# Patient Record
Sex: FEMALE | Race: BLACK OR AFRICAN AMERICAN | NOT HISPANIC OR LATINO
[De-identification: names, ages, dates, MRNs, and addresses within clinical notes are randomized per-mention and may not be internally consistent; named-entity substitution may affect disease eponyms.]

---

## 2018-07-18 PROBLEM — Z00.00 ENCOUNTER FOR PREVENTIVE HEALTH EXAMINATION: Status: ACTIVE | Noted: 2018-07-18

## 2018-07-31 ENCOUNTER — ASOB RESULT (OUTPATIENT)
Age: 23
End: 2018-07-31

## 2018-07-31 ENCOUNTER — EMERGENCY (EMERGENCY)
Facility: HOSPITAL | Age: 23
LOS: 1 days | Discharge: HOME | End: 2018-07-31
Attending: EMERGENCY MEDICINE | Admitting: EMERGENCY MEDICINE
Payer: COMMERCIAL

## 2018-07-31 ENCOUNTER — APPOINTMENT (OUTPATIENT)
Dept: ANTEPARTUM | Facility: CLINIC | Age: 23
End: 2018-07-31
Payer: COMMERCIAL

## 2018-07-31 VITALS
HEART RATE: 83 BPM | SYSTOLIC BLOOD PRESSURE: 111 MMHG | TEMPERATURE: 98 F | OXYGEN SATURATION: 100 % | RESPIRATION RATE: 16 BRPM | DIASTOLIC BLOOD PRESSURE: 64 MMHG

## 2018-07-31 LAB
ALBUMIN SERPL ELPH-MCNC: 4.4 G/DL — SIGNIFICANT CHANGE UP (ref 3.3–5)
ALP SERPL-CCNC: 47 U/L — SIGNIFICANT CHANGE UP (ref 40–120)
ALT FLD-CCNC: 9 U/L — SIGNIFICANT CHANGE UP (ref 4–33)
AST SERPL-CCNC: 18 U/L — SIGNIFICANT CHANGE UP (ref 4–32)
BASOPHILS # BLD AUTO: 0.04 K/UL — SIGNIFICANT CHANGE UP (ref 0–0.2)
BASOPHILS NFR BLD AUTO: 0.4 % — SIGNIFICANT CHANGE UP (ref 0–2)
BILIRUB SERPL-MCNC: 0.3 MG/DL — SIGNIFICANT CHANGE UP (ref 0.2–1.2)
BUN SERPL-MCNC: 5 MG/DL — LOW (ref 7–23)
CALCIUM SERPL-MCNC: 9.7 MG/DL — SIGNIFICANT CHANGE UP (ref 8.4–10.5)
CHLORIDE SERPL-SCNC: 101 MMOL/L — SIGNIFICANT CHANGE UP (ref 98–107)
CO2 SERPL-SCNC: 22 MMOL/L — SIGNIFICANT CHANGE UP (ref 22–31)
CREAT SERPL-MCNC: 0.64 MG/DL — SIGNIFICANT CHANGE UP (ref 0.5–1.3)
EOSINOPHIL # BLD AUTO: 0.18 K/UL — SIGNIFICANT CHANGE UP (ref 0–0.5)
EOSINOPHIL NFR BLD AUTO: 1.6 % — SIGNIFICANT CHANGE UP (ref 0–6)
GLUCOSE SERPL-MCNC: 107 MG/DL — HIGH (ref 70–99)
HCG SERPL-ACNC: SIGNIFICANT CHANGE UP MIU/ML
HCT VFR BLD CALC: 34.8 % — SIGNIFICANT CHANGE UP (ref 34.5–45)
HGB BLD-MCNC: 11.2 G/DL — LOW (ref 11.5–15.5)
IMM GRANULOCYTES # BLD AUTO: 0.05 # — SIGNIFICANT CHANGE UP
IMM GRANULOCYTES NFR BLD AUTO: 0.5 % — SIGNIFICANT CHANGE UP (ref 0–1.5)
LYMPHOCYTES # BLD AUTO: 1.55 K/UL — SIGNIFICANT CHANGE UP (ref 1–3.3)
LYMPHOCYTES # BLD AUTO: 14 % — SIGNIFICANT CHANGE UP (ref 13–44)
MCHC RBC-ENTMCNC: 25.2 PG — LOW (ref 27–34)
MCHC RBC-ENTMCNC: 32.2 % — SIGNIFICANT CHANGE UP (ref 32–36)
MCV RBC AUTO: 78.2 FL — LOW (ref 80–100)
MONOCYTES # BLD AUTO: 0.79 K/UL — SIGNIFICANT CHANGE UP (ref 0–0.9)
MONOCYTES NFR BLD AUTO: 7.1 % — SIGNIFICANT CHANGE UP (ref 2–14)
NEUTROPHILS # BLD AUTO: 8.48 K/UL — HIGH (ref 1.8–7.4)
NEUTROPHILS NFR BLD AUTO: 76.4 % — SIGNIFICANT CHANGE UP (ref 43–77)
NRBC # FLD: 0 — SIGNIFICANT CHANGE UP
PLATELET # BLD AUTO: 391 K/UL — SIGNIFICANT CHANGE UP (ref 150–400)
PMV BLD: 9.5 FL — SIGNIFICANT CHANGE UP (ref 7–13)
POTASSIUM SERPL-MCNC: 4 MMOL/L — SIGNIFICANT CHANGE UP (ref 3.5–5.3)
POTASSIUM SERPL-SCNC: 4 MMOL/L — SIGNIFICANT CHANGE UP (ref 3.5–5.3)
PROT SERPL-MCNC: 7.8 G/DL — SIGNIFICANT CHANGE UP (ref 6–8.3)
RBC # BLD: 4.45 M/UL — SIGNIFICANT CHANGE UP (ref 3.8–5.2)
RBC # FLD: 16.2 % — HIGH (ref 10.3–14.5)
SODIUM SERPL-SCNC: 138 MMOL/L — SIGNIFICANT CHANGE UP (ref 135–145)
WBC # BLD: 11.09 K/UL — HIGH (ref 3.8–10.5)
WBC # FLD AUTO: 11.09 K/UL — HIGH (ref 3.8–10.5)

## 2018-07-31 PROCEDURE — 76801 OB US < 14 WKS SINGLE FETUS: CPT

## 2018-07-31 PROCEDURE — 99285 EMERGENCY DEPT VISIT HI MDM: CPT

## 2018-07-31 NOTE — ED PROVIDER NOTE - ATTENDING CONTRIBUTION TO CARE
I, Joe Barragan MD, personally saw the patient with the resident, and completed the key components of the history and physical exam. I then discussed the management plan with the resident.    22F 6 week pregnant () with no other PMH presents to the ED after ultrasound at her Gyn showed no fetal cardiac activity.  Dates by LMP approx 12 wks, US showing 6wk fetus w/o cardiac activity on routine first US.  Pt was unsure of what was supposed to do so came to ED.  Denies AP, vaginal pain/bleeding, fever, CP, SOB.    ***GEN - NAD; well appearing; A+O x3 ***HEAD - NC/AT   ***PULMONARY - CTA b/l, symmetric breath sounds. ***CARDIAC -s1s2, RRR, no M,G,R  ***ABDOMEN - ND, NT, soft, no guarding, no rebound, no masses    ***SKIN - no rash or bruising   ***NEUROLOGIC - alert and oriented, follows commands, sensation nl, motor nl, gait nl, ***PSYCH - insight and judgment nl, memory nl, affect nl, thought nl    See MDM

## 2018-07-31 NOTE — ED PROVIDER NOTE - OBJECTIVE STATEMENT
OB/GYN, Vikas 22F 6 week pregnant with no other PMH presents to the ED after ultrasound at her Gyn showed no fetal cardiac activity. She came to the Ed because she was worried if there was anything else she needs to do. Pt denies abdominal pain, vaginal bleeding, unusual discharge, dysuria, or other urinary complaints. Pt's ultrasound also showed a [id number] R ovarian cyst. No fever, chills, n/v.

## 2018-07-31 NOTE — ED ADULT NURSE NOTE - CHIEF COMPLAINT
(coverage RN [phone]) The patient is a 22y Female sent from OB.  Pt reports 12 weeks of pregnancy, did ultrasound with no fetal heartbeat today.  .  last OB check was 1 month ago without sonogram.  Denies vaginal bleeding, admits backpain.  Pmh of L ovarian cyst.  IV accessed.  labs sent.  Waiting to be seen.

## 2018-07-31 NOTE — ED PROVIDER NOTE - NS ED ROS FT
CONSTITUTIONAL: No fever, chills, or malaise  NEUROLOGICAL: No numbness or weakness   SKIN: No rash or pruritus  EYES: No blurred vision or diplopia  ENT: No nasal congestion, rhinorrhea, sore throat, or dysphagoa   NECK: No neck pain or stiffness  RESPIRATORY: No cough or shortness of breath  CARDIOVASCULAR: No chest pain or palpitations  GASTROINTESTINAL: No abdominal pain, nausea, vomiting, diarrhea, melena, or hematochezia   GENITOURINARY: see HPI  MUSCULOSKELATAL: No joint or back pain   HEMATOLOGIC: No easy bleeding or bruising   All other review of systems is negative unless indicated above

## 2018-07-31 NOTE — ED ADULT TRIAGE NOTE - CHIEF COMPLAINT QUOTE
pt 12 weeks pregnant (JHOANA 2/11/19). had US today and was told there was no fetal heartbeat. denies vaginal bleeding. c.o back pain. denies pmh.

## 2018-07-31 NOTE — ED PROVIDER NOTE - MEDICAL DECISION MAKING DETAILS
pt w/ threatened AB by US performed (access via outpatient EMR), will get OB consult Pt with threatened , no vaginal bleeding. Gyn consulted, will follow up with outpatient Gyn tomorrow for repeat HCG.

## 2018-07-31 NOTE — ED PROVIDER NOTE - PHYSICAL EXAMINATION
General: NAD, appears comfortable  Skin: Warm and dry  Neuro: AAOx3, nonfocal  HEENT: PERRL, EOMI, no oral lesions  Neck: Full range of motion, no JVD  Lungs: Clear to ascultation bilatereally, no wheezes, rales, or rhonchi   Heart: Regular rate and rhythm, no murmurs  Abdomen: Normoactive bowl sounds, soft, nontender, nondistended  Extremities: No lower extremity tenderness, erythema, or edema   Vascular: 2+ radial and pedal pulses  Lymph: no cervical lymphadenopathy  Psych: normal mood and affect

## 2018-07-31 NOTE — ED PROVIDER NOTE - PROGRESS NOTE DETAILS
[place]: Evaluated by OBGYN, likely fetal demise (given hcg) vs. early pregnancy. Recommending outpt f/u, given copy of results, comfortable for dc.

## 2018-08-01 DIAGNOSIS — O02.1 MISSED ABORTION: ICD-10-CM

## 2018-08-01 LAB
BLD GP AB SCN SERPL QL: NEGATIVE — SIGNIFICANT CHANGE UP
RH IG SCN BLD-IMP: POSITIVE — SIGNIFICANT CHANGE UP

## 2018-08-01 NOTE — CONSULT NOTE ADULT - PROBLEM SELECTOR RECOMMENDATION 9
- TVUS obtained 7/31, as above. Patient with no pain or bleeding. Presented due to concerns of miscarriage and what to do. Patient counseled on need to repeat ultrasound and bHCG to follow. Patient to get repeat bHCG and have appointment with Dr. Bean on Thursday in the office. Aware that if there is any bleeding, pain, discomfort, she should call or come to the emergency room. All questions answered and addressed, patient understands.     D/w Dr. Bhavana Edmondson, PGY-2

## 2018-08-01 NOTE — CONSULT NOTE ADULT - SUBJECTIVE AND OBJECTIVE BOX
GYN Consult Note     22y  w/ LMP 18 presents from home after having ultrasound performed today with fetal pole with no cardiac activity, measuring 6w1d. Patient comes in tonight for concerns of miscarriage and wondering if there is anything else to be done. Patient reports intermittent nausea with two episodes of vomiting today. Reports + home pregnancy test at end of May and was seen in the office at the end of  by Dr. Bean but no ultrasound was obtained. This is a desired pregnancy. Denies any vaginal bleeding, abdominal pelvic, pelvic pain, fevers, chills, CP/SOB, dysuria, diarrhea, constipation.     OB/GYN HISTORY: . Dx with R ovarian cyst today, denies any previous knowledge of cyst. Hx of abnormal pap smear w/ follow up scheduled. Denies any uterine fibroids.       Last Menstrual Period: 18     Name of GYN Physician: Dr. Bean   History of Abnormal Pap: yes        PAST MEDICAL & SURGICAL HISTORY:  No pertinent past medical history  No significant past surgical history      REVIEW OF SYSTEMS  Denies any vaginal bleeding, abdominal pelvic, pelvic pain, fevers, chills, CP/SOB, dysuria, diarrhea, constipation.     MEDICATIONS  (STANDING):    MEDICATIONS  (PRN):      Allergies    No Known Allergies    Intolerances        SOCIAL HISTORY: works as ; denies any alcohol, drug or tobacco use     FAMILY HISTORY:  No pertinent family history in first degree relatives      Vital Signs Last 24 Hrs  T(C): 36.6 (2018 21:29), Max: 36.6 (2018 21:29)  T(F): 97.9 (2018 21:29), Max: 97.9 (2018 21:29)  HR: 83 (2018 21:29) (83 - 83)  BP: 111/64 (2018 21:29) (111/64 - 111/64)  BP(mean): --  RR: 16 (2018 21:29) (16 - 16)  SpO2: 100% (2018 21:29) (100% - 100%)    PHYSICAL EXAM:      Constitutional: alert and oriented x 3    Breasts: no tenderness, no nodules    Respiratory: clear    Cardiovascular: regular rate and rhythm    Gastrointestinal: soft, non tender, no rebound or guarding, + bowel sounds. No hepatosplenomegaly, no palpable masses    Genitourinary: NEFG  Cervix: closed/ long, no CMT  Uterus: normal size, non tender  Adnexa: non tender, fullness palpated on right         LABS:                        11.2   11.09 )-----------( 391      ( 2018 22:33 )             34.8     -    138  |  101  |  5<L>  ----------------------------<  107<H>  4.0   |  22  |  0.64    Ca    9.7      2018 22:33    TPro  7.8  /  Alb  4.4  /  TBili  0.3  /  DBili  x   /  AST  18  /  ALT  9   /  AlkPhos  47            RADIOLOGY & ADDITIONAL STUDIES:    Performed today at [facility]     Cervix WNL  Left ovary 3.2 x 1.8 WNL  Right ovary 11 x 7.4 x 11.12 w/ unilocular cyst 6.0 x 9.9 x 10cm   Adnexa WNL     Fetal pole measures 6w1d - no fetal cardiac activity. Likely missed  GYN Consult Note     22y  w/ LMP 18 presents from home after having ultrasound performed today with fetal pole with no cardiac activity, measuring 6w1d. Patient comes in tonight for concerns of miscarriage and wondering if there is anything else to be done. Patient reports intermittent nausea with two episodes of vomiting today. Reports + home pregnancy test at end of May and was seen in the office at the end of  by Dr. Bean but no ultrasound was obtained. This is a desired pregnancy. Denies any vaginal bleeding, abdominal pelvic, pelvic pain, fevers, chills, CP/SOB, dysuria, diarrhea, constipation.     OB/GYN HISTORY: . Dx with R ovarian cyst today, denies any previous knowledge of cyst. Hx of abnormal pap smear w/ follow up scheduled. Denies any uterine fibroids.       Last Menstrual Period: 18     Name of GYN Physician: Dr. Bean   History of Abnormal Pap: yes        PAST MEDICAL & SURGICAL HISTORY:  No pertinent past medical history  No significant past surgical history      REVIEW OF SYSTEMS  Denies any vaginal bleeding, abdominal pelvic, pelvic pain, fevers, chills, CP/SOB, dysuria, diarrhea, constipation.     MEDICATIONS  (STANDING):    MEDICATIONS  (PRN):      Allergies    No Known Allergies    Intolerances        SOCIAL HISTORY: works as ; denies any alcohol, drug or tobacco use     FAMILY HISTORY:  No pertinent family history in first degree relatives      Vital Signs Last 24 Hrs  T(C): 36.6 (2018 21:29), Max: 36.6 (2018 21:29)  T(F): 97.9 (2018 21:29), Max: 97.9 (2018 21:29)  HR: 83 (2018 21:29) (83 - 83)  BP: 111/64 (2018 21:29) (111/64 - 111/64)  BP(mean): --  RR: 16 (2018 21:29) (16 - 16)  SpO2: 100% (2018 21:29) (100% - 100%)    PHYSICAL EXAM:      Constitutional: alert and oriented x 3    Breasts: no tenderness, no nodules    Respiratory: clear    Cardiovascular: regular rate and rhythm    Gastrointestinal: soft, non tender, no rebound or guarding, + bowel sounds. No hepatosplenomegaly, no palpable masses    Genitourinary: NEFG  Cervix: closed/ long, no CMT  Uterus: normal size, non tender  Adnexa: non tender, fullness palpated on right         LABS:                        11.2   11.09 )-----------( 391      ( 2018 22:33 )             34.8         138  |  101  |  5<L>  ----------------------------<  107<H>  4.0   |  22  |  0.64    Ca    9.7      2018 22:33    TPro  7.8  /  Alb  4.4  /  TBili  0.3  /  DBili  x   /  AST  18  /  ALT  9   /  AlkPhos  47        [facility] 17,083     RADIOLOGY & ADDITIONAL STUDIES:    Performed today at [facility]     Cervix WNL  Left ovary 3.2 x 1.8 WNL  Right ovary 11 x 7.4 x 11.12 w/ unilocular cyst 6.0 x 9.9 x 10cm   Adnexa WNL     Fetal pole measures 6w1d - no fetal cardiac activity. Likely missed

## 2018-10-10 ENCOUNTER — APPOINTMENT (OUTPATIENT)
Dept: ULTRASOUND IMAGING | Facility: IMAGING CENTER | Age: 23
End: 2018-10-10

## 2018-10-10 ENCOUNTER — OUTPATIENT (OUTPATIENT)
Dept: OUTPATIENT SERVICES | Facility: HOSPITAL | Age: 23
LOS: 1 days | End: 2018-10-10
Payer: COMMERCIAL

## 2018-10-10 DIAGNOSIS — Z00.8 ENCOUNTER FOR OTHER GENERAL EXAMINATION: ICD-10-CM

## 2018-10-10 PROCEDURE — 76830 TRANSVAGINAL US NON-OB: CPT

## 2018-10-10 PROCEDURE — 76830 TRANSVAGINAL US NON-OB: CPT | Mod: 26

## 2018-11-26 ENCOUNTER — APPOINTMENT (OUTPATIENT)
Dept: OBGYN | Facility: CLINIC | Age: 23
End: 2018-11-26
Payer: COMMERCIAL

## 2018-11-26 PROCEDURE — 99243 OFF/OP CNSLTJ NEW/EST LOW 30: CPT

## 2019-01-24 ENCOUNTER — APPOINTMENT (OUTPATIENT)
Dept: OBGYN | Facility: HOSPITAL | Age: 24
End: 2019-01-24

## 2019-02-05 ENCOUNTER — APPOINTMENT (OUTPATIENT)
Dept: OBGYN | Facility: CLINIC | Age: 24
End: 2019-02-05

## 2020-01-27 ENCOUNTER — ASOB RESULT (OUTPATIENT)
Age: 25
End: 2020-01-27

## 2020-01-27 ENCOUNTER — APPOINTMENT (OUTPATIENT)
Dept: ANTEPARTUM | Facility: CLINIC | Age: 25
End: 2020-01-27
Payer: SELF-PAY

## 2020-01-27 PROCEDURE — 76811 OB US DETAILED SNGL FETUS: CPT

## 2020-01-27 PROCEDURE — 76819 FETAL BIOPHYS PROFIL W/O NST: CPT

## 2020-01-27 PROCEDURE — 99241 OFFICE CONSULTATION NEW/ESTAB PATIENT 15 MIN: CPT | Mod: 25

## 2020-02-20 ENCOUNTER — OUTPATIENT (OUTPATIENT)
Dept: INPATIENT UNIT | Facility: HOSPITAL | Age: 25
LOS: 1 days | Discharge: HOME | End: 2020-02-20
Payer: COMMERCIAL

## 2020-02-20 VITALS — HEART RATE: 112 BPM | SYSTOLIC BLOOD PRESSURE: 104 MMHG | DIASTOLIC BLOOD PRESSURE: 54 MMHG

## 2020-02-20 VITALS — TEMPERATURE: 98 F

## 2020-02-20 DIAGNOSIS — Z98.890 OTHER SPECIFIED POSTPROCEDURAL STATES: Chronic | ICD-10-CM

## 2020-02-20 DIAGNOSIS — O26.899 OTHER SPECIFIED PREGNANCY RELATED CONDITIONS, UNSPECIFIED TRIMESTER: ICD-10-CM

## 2020-02-20 DIAGNOSIS — Z3A.00 WEEKS OF GESTATION OF PREGNANCY NOT SPECIFIED: ICD-10-CM

## 2020-02-20 LAB
ALBUMIN SERPL ELPH-MCNC: 3.5 G/DL — SIGNIFICANT CHANGE UP (ref 3.3–5)
ALP SERPL-CCNC: 154 U/L — HIGH (ref 40–120)
ALT FLD-CCNC: 8 U/L — SIGNIFICANT CHANGE UP (ref 4–33)
ANION GAP SERPL CALC-SCNC: 13 MMO/L — SIGNIFICANT CHANGE UP (ref 7–14)
APPEARANCE UR: CLEAR — SIGNIFICANT CHANGE UP
APTT BLD: 28.3 SEC — SIGNIFICANT CHANGE UP (ref 27.5–36.3)
AST SERPL-CCNC: 21 U/L — SIGNIFICANT CHANGE UP (ref 4–32)
BASOPHILS # BLD AUTO: 0.03 K/UL — SIGNIFICANT CHANGE UP (ref 0–0.2)
BASOPHILS NFR BLD AUTO: 0.3 % — SIGNIFICANT CHANGE UP (ref 0–2)
BILIRUB SERPL-MCNC: 0.4 MG/DL — SIGNIFICANT CHANGE UP (ref 0.2–1.2)
BILIRUB UR-MCNC: NEGATIVE — SIGNIFICANT CHANGE UP
BLD GP AB SCN SERPL QL: NEGATIVE — SIGNIFICANT CHANGE UP
BLOOD UR QL VISUAL: NEGATIVE — SIGNIFICANT CHANGE UP
BUN SERPL-MCNC: 2 MG/DL — LOW (ref 7–23)
CALCIUM SERPL-MCNC: 9.6 MG/DL — SIGNIFICANT CHANGE UP (ref 8.4–10.5)
CHLORIDE SERPL-SCNC: 104 MMOL/L — SIGNIFICANT CHANGE UP (ref 98–107)
CO2 SERPL-SCNC: 20 MMOL/L — LOW (ref 22–31)
COLOR SPEC: SIGNIFICANT CHANGE UP
CREAT ?TM UR-MCNC: 52.7 MG/DL — SIGNIFICANT CHANGE UP
CREAT SERPL-MCNC: 0.57 MG/DL — SIGNIFICANT CHANGE UP (ref 0.5–1.3)
EOSINOPHIL # BLD AUTO: 0.13 K/UL — SIGNIFICANT CHANGE UP (ref 0–0.5)
EOSINOPHIL NFR BLD AUTO: 1.5 % — SIGNIFICANT CHANGE UP (ref 0–6)
FIBRINOGEN PPP-MCNC: 575 MG/DL — HIGH (ref 350–510)
GLUCOSE SERPL-MCNC: 107 MG/DL — HIGH (ref 70–99)
GLUCOSE UR-MCNC: NEGATIVE — SIGNIFICANT CHANGE UP
HCT VFR BLD CALC: 33.9 % — LOW (ref 34.5–45)
HGB BLD-MCNC: 10.5 G/DL — LOW (ref 11.5–15.5)
IMM GRANULOCYTES NFR BLD AUTO: 1.5 % — SIGNIFICANT CHANGE UP (ref 0–1.5)
INR BLD: 1.05 — SIGNIFICANT CHANGE UP (ref 0.88–1.17)
KETONES UR-MCNC: NEGATIVE — SIGNIFICANT CHANGE UP
LDH SERPL L TO P-CCNC: 176 U/L — SIGNIFICANT CHANGE UP (ref 135–225)
LEUKOCYTE ESTERASE UR-ACNC: NEGATIVE — SIGNIFICANT CHANGE UP
LYMPHOCYTES # BLD AUTO: 2 K/UL — SIGNIFICANT CHANGE UP (ref 1–3.3)
LYMPHOCYTES # BLD AUTO: 22.7 % — SIGNIFICANT CHANGE UP (ref 13–44)
MCHC RBC-ENTMCNC: 24.8 PG — LOW (ref 27–34)
MCHC RBC-ENTMCNC: 31 % — LOW (ref 32–36)
MCV RBC AUTO: 80 FL — SIGNIFICANT CHANGE UP (ref 80–100)
MONOCYTES # BLD AUTO: 0.88 K/UL — SIGNIFICANT CHANGE UP (ref 0–0.9)
MONOCYTES NFR BLD AUTO: 10 % — SIGNIFICANT CHANGE UP (ref 2–14)
NEUTROPHILS # BLD AUTO: 5.63 K/UL — SIGNIFICANT CHANGE UP (ref 1.8–7.4)
NEUTROPHILS NFR BLD AUTO: 64 % — SIGNIFICANT CHANGE UP (ref 43–77)
NITRITE UR-MCNC: NEGATIVE — SIGNIFICANT CHANGE UP
NRBC # FLD: 0.03 K/UL — SIGNIFICANT CHANGE UP (ref 0–0)
PH UR: 7.5 — SIGNIFICANT CHANGE UP (ref 5–8)
PLATELET # BLD AUTO: 343 K/UL — SIGNIFICANT CHANGE UP (ref 150–400)
PMV BLD: 10.2 FL — SIGNIFICANT CHANGE UP (ref 7–13)
POTASSIUM SERPL-MCNC: 3.9 MMOL/L — SIGNIFICANT CHANGE UP (ref 3.5–5.3)
POTASSIUM SERPL-SCNC: 3.9 MMOL/L — SIGNIFICANT CHANGE UP (ref 3.5–5.3)
PROT SERPL-MCNC: 7 G/DL — SIGNIFICANT CHANGE UP (ref 6–8.3)
PROT UR-MCNC: 6.4 MG/DL — SIGNIFICANT CHANGE UP
PROT UR-MCNC: NEGATIVE — SIGNIFICANT CHANGE UP
PROTHROM AB SERPL-ACNC: 12 SEC — SIGNIFICANT CHANGE UP (ref 9.8–13.1)
RBC # BLD: 4.24 M/UL — SIGNIFICANT CHANGE UP (ref 3.8–5.2)
RBC # FLD: 16.8 % — HIGH (ref 10.3–14.5)
RH IG SCN BLD-IMP: POSITIVE — SIGNIFICANT CHANGE UP
SODIUM SERPL-SCNC: 137 MMOL/L — SIGNIFICANT CHANGE UP (ref 135–145)
SP GR SPEC: 1.01 — SIGNIFICANT CHANGE UP (ref 1–1.04)
URATE SERPL-MCNC: 5.1 MG/DL — SIGNIFICANT CHANGE UP (ref 2.5–7)
UROBILINOGEN FLD QL: NORMAL — SIGNIFICANT CHANGE UP
WBC # BLD: 8.8 K/UL — SIGNIFICANT CHANGE UP (ref 3.8–10.5)
WBC # FLD AUTO: 8.8 K/UL — SIGNIFICANT CHANGE UP (ref 3.8–10.5)

## 2020-02-20 PROCEDURE — 59025 FETAL NON-STRESS TEST: CPT | Mod: 26,59

## 2020-02-20 PROCEDURE — 99203 OFFICE O/P NEW LOW 30 MIN: CPT

## 2020-02-20 PROCEDURE — 76818 FETAL BIOPHYS PROFILE W/NST: CPT | Mod: 26

## 2020-02-20 RX ORDER — SODIUM CHLORIDE 9 MG/ML
3 INJECTION INTRAMUSCULAR; INTRAVENOUS; SUBCUTANEOUS EVERY 8 HOURS
Refills: 0 | Status: DISCONTINUED | OUTPATIENT
Start: 2020-02-20 | End: 2020-03-07

## 2020-02-20 NOTE — OB PROVIDER TRIAGE NOTE - NSOBPROVIDERNOTE_OBGYN_ALL_OB_FT
24 year old female P0 at 38.5 weeks sent in with Labile BP in office 130/140/90.   mild headaches 3/10    maternal tachycardia P  122   fetal  tachycardia 160   r/o PEC   1740 p  PEC labs sent  continued monitoring of NST 24 year old female P0 at 38.5 weeks sent in with Labile BP in office 130/140/90.   mild headaches 3/10    maternal tachycardia P  122   fetal  tachycardia 160 on arrival    r/o PEC   1740 p  PEC labs sent  continued monitoring of NST and maternal pulse rate  afrebile     1830p   with acceleration 170     fetal tachycardia resolved    contractions q1-3 min   maternal P 111-121   no  evidence of infection     Hellp labs normal  case d/w Dr Chavez 24 year old female P0 at 38.5 weeks sent in with Labile BP in office 130/140/90.   mild headaches 3/10    maternal tachycardia P  122   fetal  tachycardia 160 on arrival    r/o PEC   1740 p  PEC labs sent  continued monitoring of NST and maternal pulse rate  afrebile     1830p   with acceleration 170     fetal tachycardia resolved    contractions q1-3 min   maternal P 111-121   no  evidence of infection     Hellp labs normal  case d/w Dr Chavez   cleared for discharge    instructions given    for follow up BP check /NST on 2/22   s/s of PEC reviewed   s.s of PTL reviewed

## 2020-02-20 NOTE — OB PROVIDER TRIAGE NOTE - NSHPLABSRESULTS_GEN_ALL_CORE
10.5   8.80  )-----------( 343      ( 2020 18:00 )             33.9     02-    137  |  104  |  2<L>  ----------------------------<  107<H>  3.9   |  20<L>  |  0.57    Ca    9.6      2020 18:00    TPro  7.0  /  Alb  3.5  /  TBili  0.4  /  DBili  x   /  AST  21  /  ALT  8   /  AlkPhos  154<H>  20    P/C ratio .12   Urinalysis Basic - ( 2020 18:20 )    Color: LIGHT YELLOW / Appearance: CLEAR / S.008 / pH: 7.5  Gluc: NEGATIVE / Ketone: NEGATIVE  / Bili: NEGATIVE / Urobili: NORMAL   Blood: NEGATIVE / Protein: NEGATIVE / Nitrite: NEGATIVE   Leuk Esterase: NEGATIVE / RBC: x / WBC x   Sq Epi: x / Non Sq Epi: x / Bacteria: x

## 2020-02-20 NOTE — OB PROVIDER TRIAGE NOTE - NSHPPHYSICALEXAM_GEN_ALL_CORE
pt seen and examined   ICU Vital Signs Last 24 Hrs  T(C): 36.8 (20 Feb 2020 17:39), Max: 36.8 (20 Feb 2020 17:32)  T(F): 98.2 (20 Feb 2020 17:39), Max: 98.24 (20 Feb 2020 17:32)  HR: 117 (20 Feb 2020 17:56) (107 - 122)  BP: 132/80 (20 Feb 2020 17:56) (132/80 - 140/89)  BP(mean): --  ABP: --  ABP(mean): --  RR: 14 (20 Feb 2020 17:39) (14 - 14)  SpO2: 97% (20 Feb 2020 17:53) (97% - 97%)    pt alert OX3   lungs clear   heart s1 S2   abd soft gravid  non tender   placed on EFM     contractions q 1-2 minutes   abd scan vertex zoraida 11 efw 3260g (7.3#)    posterior placenta

## 2020-02-21 LAB — T PALLIDUM AB TITR SER: NEGATIVE — SIGNIFICANT CHANGE UP

## 2020-02-22 ENCOUNTER — OUTPATIENT (OUTPATIENT)
Dept: INPATIENT UNIT | Facility: HOSPITAL | Age: 25
LOS: 1 days | Discharge: HOME | End: 2020-02-22
Payer: COMMERCIAL

## 2020-02-22 VITALS
DIASTOLIC BLOOD PRESSURE: 81 MMHG | TEMPERATURE: 98 F | HEART RATE: 105 BPM | SYSTOLIC BLOOD PRESSURE: 138 MMHG | RESPIRATION RATE: 20 BRPM

## 2020-02-22 VITALS — HEART RATE: 107 BPM | SYSTOLIC BLOOD PRESSURE: 131 MMHG | DIASTOLIC BLOOD PRESSURE: 77 MMHG

## 2020-02-22 DIAGNOSIS — O26.899 OTHER SPECIFIED PREGNANCY RELATED CONDITIONS, UNSPECIFIED TRIMESTER: ICD-10-CM

## 2020-02-22 DIAGNOSIS — Z98.890 OTHER SPECIFIED POSTPROCEDURAL STATES: Chronic | ICD-10-CM

## 2020-02-22 DIAGNOSIS — Z3A.00 WEEKS OF GESTATION OF PREGNANCY NOT SPECIFIED: ICD-10-CM

## 2020-02-22 PROBLEM — O03.9 COMPLETE OR UNSPECIFIED SPONTANEOUS ABORTION WITHOUT COMPLICATION: Chronic | Status: ACTIVE | Noted: 2020-02-20

## 2020-02-22 PROBLEM — R87.619 UNSPECIFIED ABNORMAL CYTOLOGICAL FINDINGS IN SPECIMENS FROM CERVIX UTERI: Chronic | Status: ACTIVE | Noted: 2020-02-20

## 2020-02-22 PROCEDURE — 99213 OFFICE O/P EST LOW 20 MIN: CPT

## 2020-02-22 PROCEDURE — 59025 FETAL NON-STRESS TEST: CPT | Mod: 26

## 2020-02-22 NOTE — OB PROVIDER TRIAGE NOTE - NSHPPHYSICALEXAM_GEN_ALL_CORE
T(C): 36.8 (22 Feb 2020 13:20), Max: 36.8 (22 Feb 2020 13:06)  T(F): 98.24 (22 Feb 2020 13:20), Max: 98.24 (22 Feb 2020 13:20)  HR: 105 (22 Feb 2020 13:23) (105 - 105)  BP: 138/81 (22 Feb 2020 13:23) (138/81 - 138/81)  BP(mean): --  RR: 20 (22 Feb 2020 13:06) (20 - 20)    Abdomen gravid, soft and nontender  NST -  SVE -

## 2020-02-22 NOTE — OB PROVIDER TRIAGE NOTE - HISTORY OF PRESENT ILLNESS
Patient is a 25y/o ,  @ 39wks gestation who reports to triage as f/u from Thursday visit to r/o preeclampsia.  Patient denies h/a, n/v, visual disturbances or ruq/epigastric pain.  Reports good fetal movements.  Denies contractions, lof or vaginal bleeding.    AP Course elevated BP  Meds - pnv  NKDA

## 2020-02-22 NOTE — OB PROVIDER TRIAGE NOTE - NSOBPROVIDERNOTE_OBGYN_ALL_OB_FT
Patient is a 23y/o ,  @ 39wks gestation who reports to triage as f/u from Thursday visit to r/o preeclampsia.  Patient denies h/a, n/v, visual disturbances or ruq/epigastric pain.  Reports good fetal movements.  Denies contractions, lof or vaginal bleeding.    AP Course elevated BP  Meds - pnv  NKDA      PMH - eczema  OB - SAB X 1  GYN - abnormal pap, s/p colpo; to f/u post pregnancy         - right ovarian cyst  Psych - anxiety; no meds/therapy    Vital Signs Last 24 Hrs  T(C): 36.8 (2020 13:20), Max: 36.8 (2020 13:06)  T(F): 98.24 (2020 13:20), Max: 98.24 (2020 13:20)  HR: 105 (2020 13:23) (105 - 105)  BP: 138/81 (2020 13:23) (138/81 - 138/81)  BP(mean): --  RR: 20 (2020 13:06) (20 - 20)    Abdomen gravid, soft and nontender  NST -  SVE - Patient is a 23y/o ,  @ 39wks gestation who reports to triage as f/u from Thursday visit to r/o preeclampsia.  Patient denies h/a, n/v, visual disturbances or ruq/epigastric pain.  Reports good fetal movements.  Denies contractions, lof or vaginal bleeding.    AP Course elevated BP  Meds - pnv  NKDA    PMH - eczema  OB - SAB X 1  GYN - abnormal pap, s/p colpo; to f/u post pregnancy         - right ovarian cyst  Psych - anxiety; no meds/therapy    addendum at 14:02:   Vital Signs Last 24 Hrs  T(C): 36.8 (2020 13:20), Max: 36.8 (2020 13:06)  T(F): 98.24 (2020 13:20), Max: 98.24 (2020 13:20)  HR: 105 (2020 13:23) (105 - 105)  BP: 138/81 (2020 13:23) (138/81 - 138/81)  RR: 20 (2020 13:06) (20 - 20)  reactive NST, mild irregular contractions;   case was reviewed with Dr Hamlin, gestational HTN was ruled out prior, BP wnl, reactive NST, was advised to discharge patient home with signs and symptoms of PEC, fetal movements count reviewed; follow up with PMD on 2020.

## 2020-02-22 NOTE — OB RN TRIAGE NOTE - PMH
Abnormal Pap smear of cervix    No pertinent past medical history    Spontaneous  Abnormal Pap smear of cervix    Eczema    No pertinent past medical history    Spontaneous

## 2020-03-02 PROBLEM — L30.9 DERMATITIS, UNSPECIFIED: Chronic | Status: ACTIVE | Noted: 2020-02-22

## 2020-03-04 ENCOUNTER — APPOINTMENT (OUTPATIENT)
Dept: ANTEPARTUM | Facility: HOSPITAL | Age: 25
End: 2020-03-04

## 2020-03-04 ENCOUNTER — ASOB RESULT (OUTPATIENT)
Age: 25
End: 2020-03-04

## 2020-03-04 ENCOUNTER — APPOINTMENT (OUTPATIENT)
Dept: ANTEPARTUM | Facility: CLINIC | Age: 25
End: 2020-03-04
Payer: COMMERCIAL

## 2020-03-04 ENCOUNTER — OUTPATIENT (OUTPATIENT)
Dept: OUTPATIENT SERVICES | Facility: HOSPITAL | Age: 25
LOS: 1 days | End: 2020-03-04

## 2020-03-04 DIAGNOSIS — Z98.890 OTHER SPECIFIED POSTPROCEDURAL STATES: Chronic | ICD-10-CM

## 2020-03-04 PROCEDURE — 76818 FETAL BIOPHYS PROFILE W/NST: CPT | Mod: 26

## 2020-03-04 PROCEDURE — 76816 OB US FOLLOW-UP PER FETUS: CPT

## 2020-03-07 ENCOUNTER — INPATIENT (INPATIENT)
Facility: HOSPITAL | Age: 25
LOS: 3 days | Discharge: HOME | End: 2020-03-11
Attending: OBSTETRICS & GYNECOLOGY
Payer: COMMERCIAL

## 2020-03-07 VITALS
OXYGEN SATURATION: 98 % | SYSTOLIC BLOOD PRESSURE: 124 MMHG | DIASTOLIC BLOOD PRESSURE: 71 MMHG | WEIGHT: 179.02 LBS | TEMPERATURE: 98 F | HEIGHT: 59 IN | RESPIRATION RATE: 16 BRPM | HEART RATE: 105 BPM

## 2020-03-07 DIAGNOSIS — O16.9 UNSPECIFIED MATERNAL HYPERTENSION, UNSPECIFIED TRIMESTER: ICD-10-CM

## 2020-03-07 DIAGNOSIS — Z98.890 OTHER SPECIFIED POSTPROCEDURAL STATES: Chronic | ICD-10-CM

## 2020-03-07 RX ORDER — SODIUM CHLORIDE 9 MG/ML
1000 INJECTION, SOLUTION INTRAVENOUS
Refills: 0 | Status: DISCONTINUED | OUTPATIENT
Start: 2020-03-07 | End: 2020-03-09

## 2020-03-07 RX ORDER — CITRIC ACID/SODIUM CITRATE 300-500 MG
15 SOLUTION, ORAL ORAL EVERY 6 HOURS
Refills: 0 | Status: DISCONTINUED | OUTPATIENT
Start: 2020-03-07 | End: 2020-03-09

## 2020-03-07 RX ORDER — OXYTOCIN 10 UNIT/ML
333.33 VIAL (ML) INJECTION
Qty: 20 | Refills: 0 | Status: DISCONTINUED | OUTPATIENT
Start: 2020-03-07 | End: 2020-03-09

## 2020-03-08 ENCOUNTER — TRANSCRIPTION ENCOUNTER (OUTPATIENT)
Age: 25
End: 2020-03-08

## 2020-03-08 LAB
ALBUMIN SERPL ELPH-MCNC: 3.5 G/DL — SIGNIFICANT CHANGE UP (ref 3.3–5)
ALP SERPL-CCNC: 169 U/L — HIGH (ref 40–120)
ALT FLD-CCNC: 10 U/L — SIGNIFICANT CHANGE UP (ref 4–33)
ANION GAP SERPL CALC-SCNC: 14 MMO/L — SIGNIFICANT CHANGE UP (ref 7–14)
APPEARANCE UR: SIGNIFICANT CHANGE UP
APTT BLD: 28.6 SEC — SIGNIFICANT CHANGE UP (ref 27.5–36.3)
AST SERPL-CCNC: 18 U/L — SIGNIFICANT CHANGE UP (ref 4–32)
BACTERIA # UR AUTO: HIGH
BASOPHILS # BLD AUTO: 0.04 K/UL — SIGNIFICANT CHANGE UP (ref 0–0.2)
BASOPHILS NFR BLD AUTO: 0.5 % — SIGNIFICANT CHANGE UP (ref 0–2)
BILIRUB SERPL-MCNC: 0.4 MG/DL — SIGNIFICANT CHANGE UP (ref 0.2–1.2)
BILIRUB UR-MCNC: NEGATIVE — SIGNIFICANT CHANGE UP
BLD GP AB SCN SERPL QL: NEGATIVE — SIGNIFICANT CHANGE UP
BLOOD UR QL VISUAL: NEGATIVE — SIGNIFICANT CHANGE UP
BUN SERPL-MCNC: 5 MG/DL — LOW (ref 7–23)
CALCIUM SERPL-MCNC: 9.2 MG/DL — SIGNIFICANT CHANGE UP (ref 8.4–10.5)
CHLORIDE SERPL-SCNC: 103 MMOL/L — SIGNIFICANT CHANGE UP (ref 98–107)
CO2 SERPL-SCNC: 19 MMOL/L — LOW (ref 22–31)
COLOR SPEC: YELLOW — SIGNIFICANT CHANGE UP
CREAT ?TM UR-MCNC: 233.3 MG/DL — SIGNIFICANT CHANGE UP
CREAT SERPL-MCNC: 0.52 MG/DL — SIGNIFICANT CHANGE UP (ref 0.5–1.3)
EOSINOPHIL # BLD AUTO: 0.16 K/UL — SIGNIFICANT CHANGE UP (ref 0–0.5)
EOSINOPHIL NFR BLD AUTO: 1.9 % — SIGNIFICANT CHANGE UP (ref 0–6)
FIBRINOGEN PPP-MCNC: 594 MG/DL — HIGH (ref 300–520)
GLUCOSE SERPL-MCNC: 84 MG/DL — SIGNIFICANT CHANGE UP (ref 70–99)
GLUCOSE UR-MCNC: NEGATIVE — SIGNIFICANT CHANGE UP
HBV SURFACE AG SER-ACNC: NEGATIVE — SIGNIFICANT CHANGE UP
HCT VFR BLD CALC: 31.9 % — LOW (ref 34.5–45)
HGB BLD-MCNC: 10.1 G/DL — LOW (ref 11.5–15.5)
HYALINE CASTS # UR AUTO: SIGNIFICANT CHANGE UP
IMM GRANULOCYTES NFR BLD AUTO: 1.1 % — SIGNIFICANT CHANGE UP (ref 0–1.5)
INR BLD: 1.05 — SIGNIFICANT CHANGE UP (ref 0.88–1.17)
KETONES UR-MCNC: SIGNIFICANT CHANGE UP
LDH SERPL L TO P-CCNC: 163 U/L — SIGNIFICANT CHANGE UP (ref 135–225)
LEUKOCYTE ESTERASE UR-ACNC: SIGNIFICANT CHANGE UP
LYMPHOCYTES # BLD AUTO: 2.26 K/UL — SIGNIFICANT CHANGE UP (ref 1–3.3)
LYMPHOCYTES # BLD AUTO: 26.6 % — SIGNIFICANT CHANGE UP (ref 13–44)
MCHC RBC-ENTMCNC: 25.1 PG — LOW (ref 27–34)
MCHC RBC-ENTMCNC: 31.7 % — LOW (ref 32–36)
MCV RBC AUTO: 79.4 FL — LOW (ref 80–100)
MONOCYTES # BLD AUTO: 1.09 K/UL — HIGH (ref 0–0.9)
MONOCYTES NFR BLD AUTO: 12.8 % — SIGNIFICANT CHANGE UP (ref 2–14)
MUCOUS THREADS # UR AUTO: SIGNIFICANT CHANGE UP
NEUTROPHILS # BLD AUTO: 4.86 K/UL — SIGNIFICANT CHANGE UP (ref 1.8–7.4)
NEUTROPHILS NFR BLD AUTO: 57.1 % — SIGNIFICANT CHANGE UP (ref 43–77)
NITRITE UR-MCNC: NEGATIVE — SIGNIFICANT CHANGE UP
NRBC # FLD: 0.02 K/UL — SIGNIFICANT CHANGE UP (ref 0–0)
PH UR: 6.5 — SIGNIFICANT CHANGE UP (ref 5–8)
PLATELET # BLD AUTO: 344 K/UL — SIGNIFICANT CHANGE UP (ref 150–400)
PMV BLD: 10.3 FL — SIGNIFICANT CHANGE UP (ref 7–13)
POTASSIUM SERPL-MCNC: 3.5 MMOL/L — SIGNIFICANT CHANGE UP (ref 3.5–5.3)
POTASSIUM SERPL-SCNC: 3.5 MMOL/L — SIGNIFICANT CHANGE UP (ref 3.5–5.3)
PROT SERPL-MCNC: 7.1 G/DL — SIGNIFICANT CHANGE UP (ref 6–8.3)
PROT UR-MCNC: 37.4 MG/DL — SIGNIFICANT CHANGE UP
PROT UR-MCNC: 50 — SIGNIFICANT CHANGE UP
PROTHROM AB SERPL-ACNC: 12 SEC — SIGNIFICANT CHANGE UP (ref 9.8–13.1)
RBC # BLD: 4.02 M/UL — SIGNIFICANT CHANGE UP (ref 3.8–5.2)
RBC # FLD: 16.8 % — HIGH (ref 10.3–14.5)
RBC CASTS # UR COMP ASSIST: SIGNIFICANT CHANGE UP (ref 0–?)
RH IG SCN BLD-IMP: POSITIVE — SIGNIFICANT CHANGE UP
SODIUM SERPL-SCNC: 136 MMOL/L — SIGNIFICANT CHANGE UP (ref 135–145)
SP GR SPEC: 1.03 — SIGNIFICANT CHANGE UP (ref 1–1.04)
SQUAMOUS # UR AUTO: SIGNIFICANT CHANGE UP
T PALLIDUM AB TITR SER: NEGATIVE — SIGNIFICANT CHANGE UP
URATE SERPL-MCNC: 5.1 MG/DL — SIGNIFICANT CHANGE UP (ref 2.5–7)
UROBILINOGEN FLD QL: SIGNIFICANT CHANGE UP
WBC # BLD: 8.5 K/UL — SIGNIFICANT CHANGE UP (ref 3.8–10.5)
WBC # FLD AUTO: 8.5 K/UL — SIGNIFICANT CHANGE UP (ref 3.8–10.5)
WBC UR QL: HIGH (ref 0–?)

## 2020-03-08 RX ORDER — SODIUM CHLORIDE 9 MG/ML
1000 INJECTION, SOLUTION INTRAVENOUS ONCE
Refills: 0 | Status: COMPLETED | OUTPATIENT
Start: 2020-03-08 | End: 2020-03-08

## 2020-03-08 RX ORDER — BUTORPHANOL TARTRATE 2 MG/ML
2 INJECTION, SOLUTION INTRAMUSCULAR; INTRAVENOUS ONCE
Refills: 0 | Status: DISCONTINUED | OUTPATIENT
Start: 2020-03-08 | End: 2020-03-08

## 2020-03-08 RX ADMIN — SODIUM CHLORIDE 1000 MILLILITER(S): 9 INJECTION, SOLUTION INTRAVENOUS at 01:50

## 2020-03-08 RX ADMIN — BUTORPHANOL TARTRATE 2 MILLIGRAM(S): 2 INJECTION, SOLUTION INTRAMUSCULAR; INTRAVENOUS at 13:20

## 2020-03-08 RX ADMIN — SODIUM CHLORIDE 1000 MILLILITER(S): 9 INJECTION, SOLUTION INTRAVENOUS at 19:02

## 2020-03-08 RX ADMIN — BUTORPHANOL TARTRATE 2 MILLIGRAM(S): 2 INJECTION, SOLUTION INTRAMUSCULAR; INTRAVENOUS at 15:08

## 2020-03-08 NOTE — CHART NOTE - NSCHARTNOTEFT_GEN_A_CORE
R1 OB Tracing Note    T(C): 37.0 (03-08-20 @ 21:00), Max: 37.0 (03-08-20 @ 21:00)  HR: 101 (03-08-20 @ 23:17) (41 - 167)  BP: 117/65 (03-08-20 @ 23:16) (97/55 - 150/67)  RR: 20 (03-08-20 @ 13:39) (16 - 20)  SpO2: 96% (03-08-20 @ 23:17) (91% - 100%)    EFM:  150 bpm, mod ayo, +accels, -decels  [place]: cx q2-4    A/P 24y P0 admitted for LTIOL  -CB in place, continue PO  -Cat 1 tracing    [place] PGY1

## 2020-03-08 NOTE — OB PROVIDER H&P - HISTORY OF PRESENT ILLNESS
Pt is a 23yo  @41wks presenting for IOL for LT. PNC complicated by gHTN that "resolved" per the patient. GBS neg. EFW 3629g. Patient reports +FM, +Cx, -LOF, -VB.    OBhx: 1 SAB ()  GYN: no hx of STIs; Paps up to date, hx of abnormal paps s/p colpo wnl, no fibroids, R ovarian cyst (11cm)  PMH: denies  PSH: denies  Meds: none  All: NKDA  SocHx: no tobacco, alcohol, recreational drug use  FHx: HTN -dad, maternal grandmother    VS  T(C): 36.6 (20 @ 23:58)  HR: 100 (20 @ 00:02)  BP: 124/71 (20 @ 00:02)  RR: 16 (20 @ 23:58)  SpO2: 98% (20 @ 00:01)        SVE:  0.5/50/-3  TAUS: Vertex  EFM: baseline 160bpm, mod variability/+accels/-decels  [place]: contractions q2-3min    A/P: Pt is a 23yo  @41wks presenting for IOL for LT.   -Admit to L&D  -Routine labs  -IVF  -IOL with PO Cytotec; consider CB when possible  -EMF + [place] Cat 1  -GBS neg  -EFW 3629g  -Anesthesia consult PRN  -Low DVT risk    D/w Dr. Rayev Robyn Frankel PGY1

## 2020-03-08 NOTE — CHART NOTE - NSCHARTNOTEFT_GEN_A_CORE
R1 OB Labor Note    S: Patient seen and examined at bedside for increase    Vital Signs Last 24 Hrs  T(C): 36.3 (08 Mar 2020 13:39), Max: 36.7 (08 Mar 2020 04:36)  T(F): 97.4 (08 Mar 2020 13:39), Max: 98 (08 Mar 2020 04:36)  HR: 71 (08 Mar 2020 15:25) (66 - 167)  BP: 123/67 (08 Mar 2020 13:39) (116/56 - 126/78)  BP(mean): --  RR: 20 (08 Mar 2020 13:39) (16 - 20)  SpO2: 98% (08 Mar 2020 15:25) (95% - 99%)    FHT: 130, mod, + accels, - decels  [place]: q2 min  SVE: 2.5/CB in place    A/P:   LTIOL  cont PO  CB in place  will call for epidural after transfer    Natalee Moy, PGY-1  d/w Scott

## 2020-03-08 NOTE — CHART NOTE - NSCHARTNOTEFT_GEN_A_CORE
R1 OB Labor Note    S: Patient seen and examined at bedside for increased pain    Vital Signs Last 24 Hrs  T(C): 36.6 (08 Mar 2020 09:46), Max: 36.7 (08 Mar 2020 04:36)  T(F): 97.8 (08 Mar 2020 09:46), Max: 98 (08 Mar 2020 04:36)  HR: 75 (08 Mar 2020 09:46) (73 - 113)  BP: 126/78 (08 Mar 2020 09:46) (116/56 - 126/78)  BP(mean): --  RR: 18 (08 Mar 2020 09:46) (16 - 18)  SpO2: 99% (08 Mar 2020 09:46) (98% - 99%)    FHT: cat 1  [place]: q1-2 min  SVE: 1/50/-3    A/P:   - Labor: LTIOL on PO cytotec, 4CB  - Fetus: cat 1  - GBS: neg  - Pain: declined stadol/epidural at this time    Natalee Moy, PGY-1  d/w Scott R1 OB Labor Note    S: Patient seen and examined at bedside for increased pain    Vital Signs Last 24 Hrs  T(C): 36.6 (08 Mar 2020 09:46), Max: 36.7 (08 Mar 2020 04:36)  T(F): 97.8 (08 Mar 2020 09:46), Max: 98 (08 Mar 2020 04:36)  HR: 75 (08 Mar 2020 09:46) (73 - 113)  BP: 126/78 (08 Mar 2020 09:46) (116/56 - 126/78)  BP(mean): --  RR: 18 (08 Mar 2020 09:46) (16 - 18)  SpO2: 99% (08 Mar 2020 09:46) (98% - 99%)    FHT: cat 1  [place]: q1-2 min  SVE: 1/50/-3    A/P:   - Labor: LTIOL on PO cytotec, CB placed with 80cc in each balloon  - Fetus: cat 1  - GBS: neg  - Pain: declined stadol/epidural at this time    Natalee Moy, PGY-1  d/w Scott

## 2020-03-08 NOTE — PROGRESS NOTE ADULT - SUBJECTIVE AND OBJECTIVE BOX
Patient was seen and evaluated at bedside at 240 pm, patient reports feeling contractions, pain better s/p pain med.   H&P, labs, VS, FHT reviewed. Patient is afebrile gbs neg for IOL for post dated at 41 weeks, vtx EFW  3629 g. S/p oral cyto/cervical baloon in place.  HELLP labs normal, patient is normotensive.   addendum: patient discussed with 1st year resident: c/o painful ctx, desires epidural, cervical dilatation 2.5 cm, FHT cat 1, ctx every 2-3 min  Plan: transfer to , anaesthesia consult, continue IOL  MD betty

## 2020-03-09 ENCOUNTER — RESULT REVIEW (OUTPATIENT)
Age: 25
End: 2020-03-09

## 2020-03-09 DIAGNOSIS — O14.90 UNSPECIFIED PRE-ECLAMPSIA, UNSPECIFIED TRIMESTER: ICD-10-CM

## 2020-03-09 DIAGNOSIS — O48.0 POST-TERM PREGNANCY: ICD-10-CM

## 2020-03-09 DIAGNOSIS — R34 ANURIA AND OLIGURIA: ICD-10-CM

## 2020-03-09 DIAGNOSIS — Z3A.40 40 WEEKS GESTATION OF PREGNANCY: ICD-10-CM

## 2020-03-09 DIAGNOSIS — Z98.890 OTHER SPECIFIED POSTPROCEDURAL STATES: ICD-10-CM

## 2020-03-09 DIAGNOSIS — O34.93 MATERNAL CARE FOR ABNORMALITY OF PELVIC ORGAN, UNSPECIFIED, THIRD TRIMESTER: ICD-10-CM

## 2020-03-09 DIAGNOSIS — O99.213 OBESITY COMPLICATING PREGNANCY, THIRD TRIMESTER: ICD-10-CM

## 2020-03-09 LAB
ALBUMIN SERPL ELPH-MCNC: 2.7 G/DL — LOW (ref 3.3–5)
ALBUMIN SERPL ELPH-MCNC: 3.2 G/DL — LOW (ref 3.3–5)
ALP SERPL-CCNC: 133 U/L — HIGH (ref 40–120)
ALP SERPL-CCNC: 160 U/L — HIGH (ref 40–120)
ALT FLD-CCNC: 7 U/L — SIGNIFICANT CHANGE UP (ref 4–33)
ALT FLD-CCNC: 9 U/L — SIGNIFICANT CHANGE UP (ref 4–33)
ANION GAP SERPL CALC-SCNC: 15 MMO/L — HIGH (ref 7–14)
ANION GAP SERPL CALC-SCNC: 16 MMO/L — HIGH (ref 7–14)
APPEARANCE UR: SIGNIFICANT CHANGE UP
APTT BLD: 29.1 SEC — SIGNIFICANT CHANGE UP (ref 27.5–36.3)
APTT BLD: 29.7 SEC — SIGNIFICANT CHANGE UP (ref 27.5–36.3)
AST SERPL-CCNC: 17 U/L — SIGNIFICANT CHANGE UP (ref 4–32)
AST SERPL-CCNC: 21 U/L — SIGNIFICANT CHANGE UP (ref 4–32)
BACTERIA # UR AUTO: SIGNIFICANT CHANGE UP
BASOPHILS # BLD AUTO: 0.02 K/UL — SIGNIFICANT CHANGE UP (ref 0–0.2)
BASOPHILS # BLD AUTO: 0.02 K/UL — SIGNIFICANT CHANGE UP (ref 0–0.2)
BASOPHILS # BLD AUTO: 0.04 K/UL — SIGNIFICANT CHANGE UP (ref 0–0.2)
BASOPHILS NFR BLD AUTO: 0.1 % — SIGNIFICANT CHANGE UP (ref 0–2)
BASOPHILS NFR BLD AUTO: 0.1 % — SIGNIFICANT CHANGE UP (ref 0–2)
BASOPHILS NFR BLD AUTO: 0.2 % — SIGNIFICANT CHANGE UP (ref 0–2)
BILIRUB SERPL-MCNC: 0.7 MG/DL — SIGNIFICANT CHANGE UP (ref 0.2–1.2)
BILIRUB SERPL-MCNC: 0.8 MG/DL — SIGNIFICANT CHANGE UP (ref 0.2–1.2)
BILIRUB UR-MCNC: NEGATIVE — SIGNIFICANT CHANGE UP
BLOOD UR QL VISUAL: HIGH
BUN SERPL-MCNC: 8 MG/DL — SIGNIFICANT CHANGE UP (ref 7–23)
BUN SERPL-MCNC: 9 MG/DL — SIGNIFICANT CHANGE UP (ref 7–23)
CALCIUM SERPL-MCNC: 8.9 MG/DL — SIGNIFICANT CHANGE UP (ref 8.4–10.5)
CALCIUM SERPL-MCNC: 9.4 MG/DL — SIGNIFICANT CHANGE UP (ref 8.4–10.5)
CHLORIDE SERPL-SCNC: 102 MMOL/L — SIGNIFICANT CHANGE UP (ref 98–107)
CHLORIDE SERPL-SCNC: 103 MMOL/L — SIGNIFICANT CHANGE UP (ref 98–107)
CO2 SERPL-SCNC: 17 MMOL/L — LOW (ref 22–31)
CO2 SERPL-SCNC: 18 MMOL/L — LOW (ref 22–31)
COLOR SPEC: YELLOW — SIGNIFICANT CHANGE UP
CREAT ?TM UR-MCNC: 150.8 MG/DL — SIGNIFICANT CHANGE UP
CREAT SERPL-MCNC: 1 MG/DL — SIGNIFICANT CHANGE UP (ref 0.5–1.3)
CREAT SERPL-MCNC: 1.03 MG/DL — SIGNIFICANT CHANGE UP (ref 0.5–1.3)
EOSINOPHIL # BLD AUTO: 0 K/UL — SIGNIFICANT CHANGE UP (ref 0–0.5)
EOSINOPHIL # BLD AUTO: 0.01 K/UL — SIGNIFICANT CHANGE UP (ref 0–0.5)
EOSINOPHIL # BLD AUTO: 0.02 K/UL — SIGNIFICANT CHANGE UP (ref 0–0.5)
EOSINOPHIL NFR BLD AUTO: 0 % — SIGNIFICANT CHANGE UP (ref 0–6)
EOSINOPHIL NFR BLD AUTO: 0.1 % — SIGNIFICANT CHANGE UP (ref 0–6)
EOSINOPHIL NFR BLD AUTO: 0.1 % — SIGNIFICANT CHANGE UP (ref 0–6)
FIBRINOGEN PPP-MCNC: 566 MG/DL — HIGH (ref 300–520)
FIBRINOGEN PPP-MCNC: 592 MG/DL — HIGH (ref 300–520)
GLUCOSE SERPL-MCNC: 75 MG/DL — SIGNIFICANT CHANGE UP (ref 70–99)
GLUCOSE SERPL-MCNC: 97 MG/DL — SIGNIFICANT CHANGE UP (ref 70–99)
GLUCOSE UR-MCNC: NEGATIVE — SIGNIFICANT CHANGE UP
HCT VFR BLD CALC: 28.8 % — LOW (ref 34.5–45)
HCT VFR BLD CALC: 30.8 % — LOW (ref 34.5–45)
HCT VFR BLD CALC: 31.4 % — LOW (ref 34.5–45)
HGB BLD-MCNC: 8.7 G/DL — LOW (ref 11.5–15.5)
HGB BLD-MCNC: 9.2 G/DL — LOW (ref 11.5–15.5)
HGB BLD-MCNC: 9.7 G/DL — LOW (ref 11.5–15.5)
HYALINE CASTS # UR AUTO: NEGATIVE — SIGNIFICANT CHANGE UP
IMM GRANULOCYTES NFR BLD AUTO: 0.6 % — SIGNIFICANT CHANGE UP (ref 0–1.5)
INR BLD: 1.08 — SIGNIFICANT CHANGE UP (ref 0.88–1.17)
INR BLD: 1.11 — SIGNIFICANT CHANGE UP (ref 0.88–1.17)
INR BLD: 1.13 — SIGNIFICANT CHANGE UP (ref 0.88–1.17)
KETONES UR-MCNC: NEGATIVE — SIGNIFICANT CHANGE UP
LACTATE SERPL-SCNC: 1.8 MMOL/L — SIGNIFICANT CHANGE UP (ref 0.5–2)
LACTATE SERPL-SCNC: 2.5 MMOL/L — HIGH (ref 0.5–2)
LDH SERPL L TO P-CCNC: 170 U/L — SIGNIFICANT CHANGE UP (ref 135–225)
LDH SERPL L TO P-CCNC: 200 U/L — SIGNIFICANT CHANGE UP (ref 135–225)
LEUKOCYTE ESTERASE UR-ACNC: SIGNIFICANT CHANGE UP
LYMPHOCYTES # BLD AUTO: 0.88 K/UL — LOW (ref 1–3.3)
LYMPHOCYTES # BLD AUTO: 1.13 K/UL — SIGNIFICANT CHANGE UP (ref 1–3.3)
LYMPHOCYTES # BLD AUTO: 1.58 K/UL — SIGNIFICANT CHANGE UP (ref 1–3.3)
LYMPHOCYTES # BLD AUTO: 5.9 % — LOW (ref 13–44)
LYMPHOCYTES # BLD AUTO: 8.1 % — LOW (ref 13–44)
LYMPHOCYTES # BLD AUTO: 9.8 % — LOW (ref 13–44)
MCHC RBC-ENTMCNC: 24.3 PG — LOW (ref 27–34)
MCHC RBC-ENTMCNC: 24.4 PG — LOW (ref 27–34)
MCHC RBC-ENTMCNC: 24.6 PG — LOW (ref 27–34)
MCHC RBC-ENTMCNC: 29.9 % — LOW (ref 32–36)
MCHC RBC-ENTMCNC: 30.2 % — LOW (ref 32–36)
MCHC RBC-ENTMCNC: 30.9 % — LOW (ref 32–36)
MCV RBC AUTO: 79.5 FL — LOW (ref 80–100)
MCV RBC AUTO: 80.7 FL — SIGNIFICANT CHANGE UP (ref 80–100)
MCV RBC AUTO: 81.5 FL — SIGNIFICANT CHANGE UP (ref 80–100)
MONOCYTES # BLD AUTO: 1.52 K/UL — HIGH (ref 0–0.9)
MONOCYTES # BLD AUTO: 1.68 K/UL — HIGH (ref 0–0.9)
MONOCYTES # BLD AUTO: 1.85 K/UL — HIGH (ref 0–0.9)
MONOCYTES NFR BLD AUTO: 10.1 % — SIGNIFICANT CHANGE UP (ref 2–14)
MONOCYTES NFR BLD AUTO: 11.4 % — SIGNIFICANT CHANGE UP (ref 2–14)
MONOCYTES NFR BLD AUTO: 12 % — SIGNIFICANT CHANGE UP (ref 2–14)
NEUTROPHILS # BLD AUTO: 11.06 K/UL — HIGH (ref 1.8–7.4)
NEUTROPHILS # BLD AUTO: 12.53 K/UL — HIGH (ref 1.8–7.4)
NEUTROPHILS # BLD AUTO: 12.62 K/UL — HIGH (ref 1.8–7.4)
NEUTROPHILS NFR BLD AUTO: 77.9 % — HIGH (ref 43–77)
NEUTROPHILS NFR BLD AUTO: 79.1 % — HIGH (ref 43–77)
NEUTROPHILS NFR BLD AUTO: 83.3 % — HIGH (ref 43–77)
NITRITE UR-MCNC: NEGATIVE — SIGNIFICANT CHANGE UP
NRBC # FLD: 0 K/UL — SIGNIFICANT CHANGE UP (ref 0–0)
NRBC # FLD: 0 K/UL — SIGNIFICANT CHANGE UP (ref 0–0)
NRBC # FLD: 0.02 K/UL — SIGNIFICANT CHANGE UP (ref 0–0)
PH UR: 6.5 — SIGNIFICANT CHANGE UP (ref 5–8)
PLATELET # BLD AUTO: 260 K/UL — SIGNIFICANT CHANGE UP (ref 150–400)
PLATELET # BLD AUTO: 300 K/UL — SIGNIFICANT CHANGE UP (ref 150–400)
PLATELET # BLD AUTO: 309 K/UL — SIGNIFICANT CHANGE UP (ref 150–400)
PMV BLD: 10.7 FL — SIGNIFICANT CHANGE UP (ref 7–13)
PMV BLD: 10.7 FL — SIGNIFICANT CHANGE UP (ref 7–13)
PMV BLD: 10.9 FL — SIGNIFICANT CHANGE UP (ref 7–13)
POTASSIUM SERPL-MCNC: 3.7 MMOL/L — SIGNIFICANT CHANGE UP (ref 3.5–5.3)
POTASSIUM SERPL-MCNC: 3.7 MMOL/L — SIGNIFICANT CHANGE UP (ref 3.5–5.3)
POTASSIUM SERPL-SCNC: 3.7 MMOL/L — SIGNIFICANT CHANGE UP (ref 3.5–5.3)
POTASSIUM SERPL-SCNC: 3.7 MMOL/L — SIGNIFICANT CHANGE UP (ref 3.5–5.3)
PROT SERPL-MCNC: 5.4 G/DL — LOW (ref 6–8.3)
PROT SERPL-MCNC: 6.4 G/DL — SIGNIFICANT CHANGE UP (ref 6–8.3)
PROT UR-MCNC: 32 MG/DL — SIGNIFICANT CHANGE UP
PROT UR-MCNC: 50 — SIGNIFICANT CHANGE UP
PROT UR-MCNC: 93 MG/DL — SIGNIFICANT CHANGE UP
PROTHROM AB SERPL-ACNC: 12.3 SEC — SIGNIFICANT CHANGE UP (ref 9.8–13.1)
PROTHROM AB SERPL-ACNC: 12.8 SEC — SIGNIFICANT CHANGE UP (ref 9.8–13.1)
PROTHROM AB SERPL-ACNC: 13 SEC — SIGNIFICANT CHANGE UP (ref 9.8–13.1)
RBC # BLD: 3.57 M/UL — LOW (ref 3.8–5.2)
RBC # BLD: 3.78 M/UL — LOW (ref 3.8–5.2)
RBC # BLD: 3.95 M/UL — SIGNIFICANT CHANGE UP (ref 3.8–5.2)
RBC # FLD: 17.1 % — HIGH (ref 10.3–14.5)
RBC # FLD: 17.2 % — HIGH (ref 10.3–14.5)
RBC # FLD: 17.2 % — HIGH (ref 10.3–14.5)
RBC CASTS # UR COMP ASSIST: >50 — HIGH (ref 0–?)
SODIUM SERPL-SCNC: 135 MMOL/L — SIGNIFICANT CHANGE UP (ref 135–145)
SODIUM SERPL-SCNC: 136 MMOL/L — SIGNIFICANT CHANGE UP (ref 135–145)
SP GR SPEC: 1.02 — SIGNIFICANT CHANGE UP (ref 1–1.04)
SQUAMOUS # UR AUTO: SIGNIFICANT CHANGE UP
URATE SERPL-MCNC: 6.5 MG/DL — SIGNIFICANT CHANGE UP (ref 2.5–7)
URATE SERPL-MCNC: 6.6 MG/DL — SIGNIFICANT CHANGE UP (ref 2.5–7)
URATE SERPL-MCNC: 6.8 MG/DL — SIGNIFICANT CHANGE UP (ref 2.5–7)
UROBILINOGEN FLD QL: SIGNIFICANT CHANGE UP
WBC # BLD: 14 K/UL — HIGH (ref 3.8–10.5)
WBC # BLD: 15.04 K/UL — HIGH (ref 3.8–10.5)
WBC # BLD: 16.2 K/UL — HIGH (ref 3.8–10.5)
WBC # FLD AUTO: 14 K/UL — HIGH (ref 3.8–10.5)
WBC # FLD AUTO: 15.04 K/UL — HIGH (ref 3.8–10.5)
WBC # FLD AUTO: 16.2 K/UL — HIGH (ref 3.8–10.5)
WBC UR QL: HIGH (ref 0–?)

## 2020-03-09 PROCEDURE — 88305 TISSUE EXAM BY PATHOLOGIST: CPT | Mod: 26

## 2020-03-09 PROCEDURE — 88307 TISSUE EXAM BY PATHOLOGIST: CPT | Mod: 26

## 2020-03-09 PROCEDURE — 59514 CESAREAN DELIVERY ONLY: CPT | Mod: AS,U9

## 2020-03-09 RX ORDER — LABETALOL HCL 100 MG
100 TABLET ORAL THREE TIMES A DAY
Refills: 0 | Status: DISCONTINUED | OUTPATIENT
Start: 2020-03-09 | End: 2020-03-11

## 2020-03-09 RX ORDER — GLYCERIN ADULT
1 SUPPOSITORY, RECTAL RECTAL AT BEDTIME
Refills: 0 | Status: DISCONTINUED | OUTPATIENT
Start: 2020-03-09 | End: 2020-03-11

## 2020-03-09 RX ORDER — OXYCODONE HYDROCHLORIDE 5 MG/1
5 TABLET ORAL ONCE
Refills: 0 | Status: DISCONTINUED | OUTPATIENT
Start: 2020-03-09 | End: 2020-03-11

## 2020-03-09 RX ORDER — TETANUS TOXOID, REDUCED DIPHTHERIA TOXOID AND ACELLULAR PERTUSSIS VACCINE, ADSORBED 5; 2.5; 8; 8; 2.5 [IU]/.5ML; [IU]/.5ML; UG/.5ML; UG/.5ML; UG/.5ML
0.5 SUSPENSION INTRAMUSCULAR ONCE
Refills: 0 | Status: DISCONTINUED | OUTPATIENT
Start: 2020-03-10 | End: 2020-03-10

## 2020-03-09 RX ORDER — LEVETIRACETAM 250 MG/1
500 TABLET, FILM COATED ORAL
Refills: 0 | Status: DISCONTINUED | OUTPATIENT
Start: 2020-03-09 | End: 2020-03-09

## 2020-03-09 RX ORDER — LEVETIRACETAM 250 MG/1
500 TABLET, FILM COATED ORAL ONCE
Refills: 0 | Status: COMPLETED | OUTPATIENT
Start: 2020-03-10 | End: 2020-03-10

## 2020-03-09 RX ORDER — FAMOTIDINE 10 MG/ML
20 INJECTION INTRAVENOUS ONCE
Refills: 0 | Status: COMPLETED | OUTPATIENT
Start: 2020-03-09 | End: 2020-03-09

## 2020-03-09 RX ORDER — OXYTOCIN 10 UNIT/ML
2 VIAL (ML) INJECTION
Qty: 30 | Refills: 0 | Status: DISCONTINUED | OUTPATIENT
Start: 2020-03-09 | End: 2020-03-09

## 2020-03-09 RX ORDER — HEPARIN SODIUM 5000 [USP'U]/ML
5000 INJECTION INTRAVENOUS; SUBCUTANEOUS EVERY 12 HOURS
Refills: 0 | Status: DISCONTINUED | OUTPATIENT
Start: 2020-03-10 | End: 2020-03-11

## 2020-03-09 RX ORDER — ACETAMINOPHEN 500 MG
975 TABLET ORAL
Refills: 0 | Status: DISCONTINUED | OUTPATIENT
Start: 2020-03-09 | End: 2020-03-11

## 2020-03-09 RX ORDER — CITRIC ACID/SODIUM CITRATE 300-500 MG
30 SOLUTION, ORAL ORAL ONCE
Refills: 0 | Status: COMPLETED | OUTPATIENT
Start: 2020-03-09 | End: 2020-03-09

## 2020-03-09 RX ORDER — OXYCODONE HYDROCHLORIDE 5 MG/1
5 TABLET ORAL
Refills: 0 | Status: COMPLETED | OUTPATIENT
Start: 2020-03-09 | End: 2020-03-16

## 2020-03-09 RX ORDER — OXYTOCIN 10 UNIT/ML
41.67 VIAL (ML) INJECTION
Qty: 20 | Refills: 0 | Status: DISCONTINUED | OUTPATIENT
Start: 2020-03-09 | End: 2020-03-09

## 2020-03-09 RX ORDER — LANOLIN
1 OINTMENT (GRAM) TOPICAL EVERY 6 HOURS
Refills: 0 | Status: DISCONTINUED | OUTPATIENT
Start: 2020-03-09 | End: 2020-03-11

## 2020-03-09 RX ORDER — METOCLOPRAMIDE HCL 10 MG
10 TABLET ORAL ONCE
Refills: 0 | Status: COMPLETED | OUTPATIENT
Start: 2020-03-09 | End: 2020-03-09

## 2020-03-09 RX ORDER — OXYTOCIN 10 UNIT/ML
333.33 VIAL (ML) INJECTION
Qty: 20 | Refills: 0 | Status: DISCONTINUED | OUTPATIENT
Start: 2020-03-09 | End: 2020-03-10

## 2020-03-09 RX ORDER — LABETALOL HCL 100 MG
100 TABLET ORAL THREE TIMES A DAY
Refills: 0 | Status: DISCONTINUED | OUTPATIENT
Start: 2020-03-09 | End: 2020-03-09

## 2020-03-09 RX ORDER — SIMETHICONE 80 MG/1
80 TABLET, CHEWABLE ORAL EVERY 4 HOURS
Refills: 0 | Status: DISCONTINUED | OUTPATIENT
Start: 2020-03-09 | End: 2020-03-11

## 2020-03-09 RX ORDER — KETOROLAC TROMETHAMINE 30 MG/ML
30 SYRINGE (ML) INJECTION EVERY 6 HOURS
Refills: 0 | Status: DISCONTINUED | OUTPATIENT
Start: 2020-03-09 | End: 2020-03-11

## 2020-03-09 RX ORDER — MAGNESIUM HYDROXIDE 400 MG/1
30 TABLET, CHEWABLE ORAL
Refills: 0 | Status: DISCONTINUED | OUTPATIENT
Start: 2020-03-09 | End: 2020-03-11

## 2020-03-09 RX ORDER — SODIUM CHLORIDE 9 MG/ML
1000 INJECTION, SOLUTION INTRAVENOUS
Refills: 0 | Status: DISCONTINUED | OUTPATIENT
Start: 2020-03-09 | End: 2020-03-10

## 2020-03-09 RX ORDER — TETANUS TOXOID, REDUCED DIPHTHERIA TOXOID AND ACELLULAR PERTUSSIS VACCINE, ADSORBED 5; 2.5; 8; 8; 2.5 [IU]/.5ML; [IU]/.5ML; UG/.5ML; UG/.5ML; UG/.5ML
0.5 SUSPENSION INTRAMUSCULAR ONCE
Refills: 0 | Status: DISCONTINUED | OUTPATIENT
Start: 2020-03-09 | End: 2020-03-11

## 2020-03-09 RX ORDER — DIPHENHYDRAMINE HCL 50 MG
25 CAPSULE ORAL EVERY 6 HOURS
Refills: 0 | Status: DISCONTINUED | OUTPATIENT
Start: 2020-03-09 | End: 2020-03-11

## 2020-03-09 RX ORDER — SODIUM CHLORIDE 9 MG/ML
500 INJECTION, SOLUTION INTRAVENOUS ONCE
Refills: 0 | Status: COMPLETED | OUTPATIENT
Start: 2020-03-09 | End: 2020-03-09

## 2020-03-09 RX ORDER — IBUPROFEN 200 MG
600 TABLET ORAL EVERY 6 HOURS
Refills: 0 | Status: COMPLETED | OUTPATIENT
Start: 2020-03-09 | End: 2021-02-05

## 2020-03-09 RX ADMIN — Medication 1000 MILLIUNIT(S)/MIN: at 15:16

## 2020-03-09 RX ADMIN — Medication 2 MILLIUNIT(S)/MIN: at 11:42

## 2020-03-09 RX ADMIN — Medication 30 MILLILITER(S): at 12:19

## 2020-03-09 RX ADMIN — LEVETIRACETAM 500 MILLIGRAM(S): 250 TABLET, FILM COATED ORAL at 12:31

## 2020-03-09 RX ADMIN — Medication 100 MILLIGRAM(S): at 15:18

## 2020-03-09 RX ADMIN — SODIUM CHLORIDE 500 MILLILITER(S): 9 INJECTION, SOLUTION INTRAVENOUS at 16:31

## 2020-03-09 RX ADMIN — FAMOTIDINE 20 MILLIGRAM(S): 10 INJECTION INTRAVENOUS at 12:18

## 2020-03-09 RX ADMIN — Medication 100 MILLIGRAM(S): at 23:32

## 2020-03-09 RX ADMIN — SODIUM CHLORIDE 75 MILLILITER(S): 9 INJECTION, SOLUTION INTRAVENOUS at 18:20

## 2020-03-09 RX ADMIN — Medication 10 MILLIGRAM(S): at 12:19

## 2020-03-09 RX ADMIN — SODIUM CHLORIDE 125 MILLILITER(S): 9 INJECTION, SOLUTION INTRAVENOUS at 06:39

## 2020-03-09 NOTE — BRIEF OPERATIVE NOTE - NSICDXBRIEFPREOP_GEN_ALL_CORE_FT
PRE-OP DIAGNOSIS:  Oliguria 09-Mar-2020 15:21:58  Iman Milton  Failed induction of labor 09-Mar-2020 15:21:46  Iman Milton  Preeclampsia 09-Mar-2020 15:21:37  Iman Milton

## 2020-03-09 NOTE — CHART NOTE - NSCHARTNOTEFT_GEN_A_CORE
R1 Note 03-09-20 @ 02:41    Pt examined for progression     VITALS:  T(C): 36.9 (03-09-20 @ 00:39), Max: 37.0 (03-08-20 @ 21:00)  HR: 95 (03-09-20 @ 02:37) (41 - 167)  BP: 137/66 (03-09-20 @ 02:31) (97/55 - 150/67)  RR: 20 (03-08-20 @ 13:39) (16 - 20)  SpO2: 100% (03-09-20 @ 02:37) (91% - 100%)    EFM:  mod ayo +accels -decels  [place]: Ctx Q2-4min, irregular  VE: 4.5/60/-3    IMPRESSION:   FHR Category: 1  Additional:    PLAN:  SVE unchanged  Cont PO      plan as per Dr Scott Holman PGY1

## 2020-03-09 NOTE — CHART NOTE - NSCHARTNOTEFT_GEN_A_CORE
NP note    Pt seen for increased pressure    Vital Signs Last 24 Hrs  T(C): 36.9 (09 Mar 2020 06:30), Max: 37.0 (08 Mar 2020 21:00)  T(F): 98.42 (09 Mar 2020 06:30), Max: 98.6 (08 Mar 2020 21:00)  HR: 93 (09 Mar 2020 08:07) (41 - 167)  BP: 115/64 (09 Mar 2020 08:06) (97/55 - 150/67)  RR: 20 (08 Mar 2020 13:39) (18 - 20)  SpO2: 97% (09 Mar 2020 08:07) (91% - 100%)EFM  /moderate variability/+ accels/early/late decels 4min prolonged decel during vaginal exam pt repositioned with recovery  [place] coupling/q 2-3min   SVE 5/70/-3    -For pitocin once tracing Cat I; update Dr. Chavez once tracing eligible for pitocin  -Consider IUPC if unable to  ctx  -D/W Dr. Scott lopez, NP

## 2020-03-09 NOTE — CHART NOTE - NSCHARTNOTEFT_GEN_A_CORE
Pt evaluated at bedside for SVE    VS  T(C): 37.0 (20 @ 21:00)  HR: 104 (20 @ 00:32)  BP: 130/79 (20 @ 00:31)  RR: 20 (20 @ 13:39)  SpO2: 98% (20 @ 00:32)    SVE: 4/60/-3, CB removed  FHT: 160 bpm, mod ayo, +acel, +early decel  [place]: q3    Plan:  Cont. PO cytotec for 1 more dose at 115 and re-evaluate.  FHT Cat 1 reassuring  Expect     d/w Dr. Scott Franco PGy1

## 2020-03-09 NOTE — CHART NOTE - NSCHARTNOTEFT_GEN_A_CORE
NP note    Pt seen for oliguria. Comfortable with epidural.     Vital Signs Last 24 Hrs  T(C): 37.0 (09 Mar 2020 09:33), Max: 37.0 (08 Mar 2020 21:00)  T(F): 98.6 (09 Mar 2020 09:33), Max: 98.6 (08 Mar 2020 21:00)  HR: 102 (09 Mar 2020 11:24) (41 - 167)  BP: 128/79 (09 Mar 2020 11:00) (97/55 - 150/67)  RR: 20 (08 Mar 2020 13:39) (20 - 20)  SpO2: 97% (09 Mar 2020 11:24) (90% - 100%)  /moderate variability/+ accels/late decels resolving  [place] q2-5min  SVE 5/70/-3    RN reports 200cc total urine output during night shift.   10 cc bloody urine noted/2 hours  [place] catheter flushed x2, sp IV bolus 300, maintenance increased to 250cc/hr  [place] catheter changed  Dr. Robison PGY 4 scanned bladder, bladder nondistended, no urine noted    25yo  @41wks for Late term IOL with PNC complicated by gHTN that "resolved" per the patient. Admission HELLP labs WNL and PCR 0.16 at the time. Now found to be oliguric and normotensive. Denies other PEC s/s.    -Awaiting rpt HELP lbs  -For pitocin (Dr. Connell covering for Dr. Chavez)    CAT lopez

## 2020-03-09 NOTE — PROGRESS NOTE ADULT - SUBJECTIVE AND OBJECTIVE BOX
Patient was seen and evaluated at bedside. Does not report any c/o. patient admitted for iol at postterm at 41 weeks, Hx of gestational hypertensive, not on meds, no elevate BPs, no s/s of pec. S/p cytotec, exam 5 cm, no significant change. ctx q 2-5 min. s/p epidural. HELLP labs sent and elevated creatinine. pitocin started for augmentation; stopped for oliguria. Bladder scan: no urine. Discussed with nursing, residents and anaesthesia. Decision: proceed with  section for arrest of dilatation and oliguria possible PEC with severe features. FHT at 150 presently reassuring with moderate variability and no decelerations, late decelerations at ~ 11 oclock resolved. Patient was counseled on the reasons for primary CD, risks of pain, bleeding, infection, injury to adjacent organs and vessels and effect on future pregnancies as well as recovery discussed with patient. Questions answered. Informed consent signed. Plan: Keppra for seizure prophylaxis, and to OR.  md betty

## 2020-03-09 NOTE — BRIEF OPERATIVE NOTE - NSICDXBRIEFPOSTOP_GEN_ALL_CORE_FT
POST-OP DIAGNOSIS:  S/P ovarian cystectomy 09-Mar-2020 15:22:24  Iman Milton  S/P  section 09-Mar-2020 15:22:14  Iman Milton

## 2020-03-09 NOTE — OB RN DELIVERY SUMMARY - NS_SEPSISRSKCALC_OBGYN_ALL_OB_FT
EOS calculated successfully. EOS Risk Factor: 0.5/1000 live births ([facility] national incidence); GA=41w2d; Temp=98.78; ROM=12.583; GBS='Negative'; Antibiotics='No antibiotics or any antibiotics < 2 hrs prior to birth'

## 2020-03-09 NOTE — OB PROVIDER IHI INDUCTION/AUGMENTATION NOTE - NS_CHECKALL_OBGYN_ALL_OB
Induction / Augmentation was discussed/H&P was completed/Order was written/Contractions pattern was reviewed/FHR was reviewed
Order was written/H&P was completed/Contractions pattern was reviewed/FHR was reviewed/Induction / Augmentation was discussed

## 2020-03-09 NOTE — OB NEONATOLOGY/PEDIATRICIAN DELIVERY SUMMARY - NSPEDSNEONOTESA_OBGYN_ALL_OB_FT
Called by Dr. Cheek to attend primary c/section for worsening preeclampsia/postdates to a 25 yo , O+, PNLs unremark GBS neg (). ROM at 00:28 , clear, no ABx. Mother received Keppra for preeclampsia. Baby born vigorous, routine resuscitation. AS 9.

## 2020-03-09 NOTE — OB NEONATOLOGY/PEDIATRICIAN DELIVERY SUMMARY - NS_NEWBORNACONDIT_OBGYN_ALL_OB
Call to patient and informed her of results and recommendations and that she will be getting a formal letter in the mail within the week.  Patient verbalized understanding.   Liveborn

## 2020-03-09 NOTE — OB PROVIDER DELIVERY SUMMARY - NSPROVIDERDELIVERYNOTE_OBGYN_ALL_OB_FT
Low transverse uterine incision after bladder flap made. Presenting part right shoulder. Head elevated and delivered through incision atraumatically, body followed. Cord clamped and cut, viable baby girl handed to peds. APGARS  9/9.  Hysterotomy repaired with vicril suture and figure of 8 sutures placed over to assure excellent hemostasis. Left ovary and tube are normal. Large single compartment colorless fluid filled cyst was noted on right ovary with tube adherent to the cyst wall. Findings discussed with patient at the time of surgery (cyst known to patient and monitored), need for removal due to large size and very high risk for torsion discussed, patient would like to save the right tube if possible, increased risk for tubal pregnancy due to surgery and cyst discussed with patient. I Ligasure used to separate cyst from the ovary, cyst was drained into the basin - 500 cc, and several bites were taken with Ivone and suture ligated  the tube from the ovarian cyst to avoid thermal damage to the tube. Adhesion barrier placed around the tube. No other lesions. Peritoneum, muscle, fascia and skin closed with suture.  ml, UO 100cc blood tinged urine. Low transverse uterine incision after bladder flap made. Presenting part right shoulder. Head elevated and delivered through incision atraumatically, body followed. Cord clamped and cut, viable baby girl handed to peds. APGAR  9/9.  Hysterotomy repaired with Vicryl suture and figure of 8 sutures placed over to assure excellent hemostasis. Left ovary and tube are normal. Large single compartment colorless fluid filled cyst was noted on right ovary with tube adherent to the cyst wall. Findings discussed with patient at the time of surgery (cyst known to patient and monitored), need for removal due to large size and very high risk for torsion discussed, patient would like to save the right tube if possible, increased risk for tubal pregnancy due to surgery and cyst discussed with patient. I LigaSure used to separate cyst from the ovary, cyst was drained into the basin - 500 cc, and several bites were taken with Ivone and suture ligated  the tube from the ovarian cyst to avoid thermal damage to the tube. Adhesion barrier placed around the tube. No other lesions. Peritoneum, muscle, fascia and skin closed with suture.  ml, UO 100cc blood tinged urine.

## 2020-03-09 NOTE — BRIEF OPERATIVE NOTE - NSICDXBRIEFPROCEDURE_GEN_ALL_CORE_FT
PROCEDURES:  Open right ovarian cystectomy 09-Mar-2020 15:21:15  Iman Milton  Primary  section 09-Mar-2020 15:21:01  Iman Milton

## 2020-03-09 NOTE — BRIEF OPERATIVE NOTE - OPERATION/FINDINGS
Low transverse uterine incision after bladder flap made. Presenting part right shoulder. Head elevated and delivered through incision atraumatically, body followed. Cord clamped and cut, viable baby girl handed to peds. APGARS  9/9.  Hysterotomy repaired with vicril suture and figure of 8 sutures placed over to assure excellent hemostasis. Left ovary and tube are normal. Large single compartment colorless fluid filled cyst was noted on right ovary with tube adherent to the cyst wall. Findings discussed with patient at the time of surgery (cyst known to patient and monitored), need for removal due to large size and very high risk for torsion discussed, patient would like to save the right tube if possible, increased risk for tubal pregnancy due to surgery and cyst discussed with patient. I Ligasure used to separate cyst from the ovary, cyst was drained into the basin - 500 cc, and several bites were taken with Ivone and suture ligated  the tube from the ovarian cyst to avoid thermal damage to the tube. Adhesion barrier placed around the tube. No other lesions. Peritoneum, muscle, fascia and skin closed with suture.  ml, UO 100cc blood tinged urine.

## 2020-03-09 NOTE — CHART NOTE - NSCHARTNOTEFT_GEN_A_CORE
Patient seen at bedside for bladder scan due to low urine output. Calhoun replaced just prior to evaluation with no urine output.    sono: vtx, bladder appears empty    plan  - patient counseled on the concern that there is renal dysfunction with low urine output. In the setting of gestational hypertension in this pregnancy, there is concern that her renal dysfunction is a sign of preeclampsia with severe features  - counseled patient that she may need treatment for pre-eclampsia pending her labs  - f/u stat HELLP labs  - IVF increased to 250cc/hr  - will continue monitoring urine output.      tbd with Dr. Scott Robison PGY-4

## 2020-03-10 ENCOUNTER — TRANSCRIPTION ENCOUNTER (OUTPATIENT)
Age: 25
End: 2020-03-10

## 2020-03-10 LAB
ALBUMIN SERPL ELPH-MCNC: 2.4 G/DL — LOW (ref 3.3–5)
ALP SERPL-CCNC: 114 U/L — SIGNIFICANT CHANGE UP (ref 40–120)
ALT FLD-CCNC: 12 U/L — SIGNIFICANT CHANGE UP (ref 4–33)
ANION GAP SERPL CALC-SCNC: 12 MMO/L — SIGNIFICANT CHANGE UP (ref 7–14)
APTT BLD: 31.1 SEC — SIGNIFICANT CHANGE UP (ref 27.5–36.3)
AST SERPL-CCNC: 29 U/L — SIGNIFICANT CHANGE UP (ref 4–32)
BASOPHILS # BLD AUTO: 0.03 K/UL — SIGNIFICANT CHANGE UP (ref 0–0.2)
BASOPHILS NFR BLD AUTO: 0.2 % — SIGNIFICANT CHANGE UP (ref 0–2)
BILIRUB SERPL-MCNC: 0.7 MG/DL — SIGNIFICANT CHANGE UP (ref 0.2–1.2)
BUN SERPL-MCNC: 8 MG/DL — SIGNIFICANT CHANGE UP (ref 7–23)
CALCIUM SERPL-MCNC: 8.7 MG/DL — SIGNIFICANT CHANGE UP (ref 8.4–10.5)
CHLORIDE SERPL-SCNC: 105 MMOL/L — SIGNIFICANT CHANGE UP (ref 98–107)
CO2 SERPL-SCNC: 18 MMOL/L — LOW (ref 22–31)
CREAT SERPL-MCNC: 0.91 MG/DL — SIGNIFICANT CHANGE UP (ref 0.5–1.3)
EOSINOPHIL # BLD AUTO: 0.07 K/UL — SIGNIFICANT CHANGE UP (ref 0–0.5)
EOSINOPHIL NFR BLD AUTO: 0.4 % — SIGNIFICANT CHANGE UP (ref 0–6)
FIBRINOGEN PPP-MCNC: 621 MG/DL — HIGH (ref 300–520)
GLUCOSE SERPL-MCNC: 102 MG/DL — HIGH (ref 70–99)
HCT VFR BLD CALC: 25.9 % — LOW (ref 34.5–45)
HGB BLD-MCNC: 8.4 G/DL — LOW (ref 11.5–15.5)
IMM GRANULOCYTES NFR BLD AUTO: 0.6 % — SIGNIFICANT CHANGE UP (ref 0–1.5)
INR BLD: 1.12 — SIGNIFICANT CHANGE UP (ref 0.88–1.17)
LDH SERPL L TO P-CCNC: 336 U/L — HIGH (ref 135–225)
LYMPHOCYTES # BLD AUTO: 1.77 K/UL — SIGNIFICANT CHANGE UP (ref 1–3.3)
LYMPHOCYTES # BLD AUTO: 11 % — LOW (ref 13–44)
MCHC RBC-ENTMCNC: 25.1 PG — LOW (ref 27–34)
MCHC RBC-ENTMCNC: 32.4 % — SIGNIFICANT CHANGE UP (ref 32–36)
MCV RBC AUTO: 77.5 FL — LOW (ref 80–100)
MONOCYTES # BLD AUTO: 1.96 K/UL — HIGH (ref 0–0.9)
MONOCYTES NFR BLD AUTO: 12.2 % — SIGNIFICANT CHANGE UP (ref 2–14)
NEUTROPHILS # BLD AUTO: 12.11 K/UL — HIGH (ref 1.8–7.4)
NEUTROPHILS NFR BLD AUTO: 75.6 % — SIGNIFICANT CHANGE UP (ref 43–77)
NRBC # FLD: 0 K/UL — SIGNIFICANT CHANGE UP (ref 0–0)
PLATELET # BLD AUTO: 248 K/UL — SIGNIFICANT CHANGE UP (ref 150–400)
PMV BLD: 10.5 FL — SIGNIFICANT CHANGE UP (ref 7–13)
POTASSIUM SERPL-MCNC: 3.6 MMOL/L — SIGNIFICANT CHANGE UP (ref 3.5–5.3)
POTASSIUM SERPL-SCNC: 3.6 MMOL/L — SIGNIFICANT CHANGE UP (ref 3.5–5.3)
PROT SERPL-MCNC: 5.3 G/DL — LOW (ref 6–8.3)
PROTHROM AB SERPL-ACNC: 12.9 SEC — SIGNIFICANT CHANGE UP (ref 9.8–13.1)
RBC # BLD: 3.34 M/UL — LOW (ref 3.8–5.2)
RBC # FLD: 17.1 % — HIGH (ref 10.3–14.5)
SODIUM SERPL-SCNC: 135 MMOL/L — SIGNIFICANT CHANGE UP (ref 135–145)
URATE SERPL-MCNC: 7 MG/DL — SIGNIFICANT CHANGE UP (ref 2.5–7)
WBC # BLD: 16.03 K/UL — HIGH (ref 3.8–10.5)
WBC # FLD AUTO: 16.03 K/UL — HIGH (ref 3.8–10.5)

## 2020-03-10 RX ORDER — SIMETHICONE 80 MG/1
80 TABLET, CHEWABLE ORAL EVERY 4 HOURS
Refills: 0 | Status: DISCONTINUED | OUTPATIENT
Start: 2020-03-10 | End: 2020-03-10

## 2020-03-10 RX ORDER — FERROUS SULFATE 325(65) MG
325 TABLET ORAL THREE TIMES A DAY
Refills: 0 | Status: DISCONTINUED | OUTPATIENT
Start: 2020-03-10 | End: 2020-03-11

## 2020-03-10 RX ORDER — MAGNESIUM HYDROXIDE 400 MG/1
30 TABLET, CHEWABLE ORAL
Refills: 0 | Status: DISCONTINUED | OUTPATIENT
Start: 2020-03-10 | End: 2020-03-10

## 2020-03-10 RX ORDER — LANOLIN
1 OINTMENT (GRAM) TOPICAL EVERY 6 HOURS
Refills: 0 | Status: DISCONTINUED | OUTPATIENT
Start: 2020-03-10 | End: 2020-03-10

## 2020-03-10 RX ORDER — OXYCODONE HYDROCHLORIDE 5 MG/1
5 TABLET ORAL
Refills: 0 | Status: DISCONTINUED | OUTPATIENT
Start: 2020-03-10 | End: 2020-03-11

## 2020-03-10 RX ORDER — ASCORBIC ACID 60 MG
500 TABLET,CHEWABLE ORAL DAILY
Refills: 0 | Status: DISCONTINUED | OUTPATIENT
Start: 2020-03-10 | End: 2020-03-11

## 2020-03-10 RX ORDER — DIPHENHYDRAMINE HCL 50 MG
25 CAPSULE ORAL EVERY 6 HOURS
Refills: 0 | Status: DISCONTINUED | OUTPATIENT
Start: 2020-03-10 | End: 2020-03-10

## 2020-03-10 RX ORDER — GLYCERIN ADULT
1 SUPPOSITORY, RECTAL RECTAL AT BEDTIME
Refills: 0 | Status: DISCONTINUED | OUTPATIENT
Start: 2020-03-10 | End: 2020-03-10

## 2020-03-10 RX ORDER — SODIUM CHLORIDE 9 MG/ML
1000 INJECTION, SOLUTION INTRAVENOUS
Refills: 0 | Status: DISCONTINUED | OUTPATIENT
Start: 2020-03-10 | End: 2020-03-10

## 2020-03-10 RX ADMIN — Medication 325 MILLIGRAM(S): at 22:49

## 2020-03-10 RX ADMIN — Medication 325 MILLIGRAM(S): at 06:30

## 2020-03-10 RX ADMIN — Medication 100 MILLIGRAM(S): at 08:22

## 2020-03-10 RX ADMIN — Medication 975 MILLIGRAM(S): at 10:15

## 2020-03-10 RX ADMIN — LEVETIRACETAM 500 MILLIGRAM(S): 250 TABLET, FILM COATED ORAL at 01:39

## 2020-03-10 RX ADMIN — Medication 975 MILLIGRAM(S): at 23:45

## 2020-03-10 RX ADMIN — Medication 975 MILLIGRAM(S): at 04:13

## 2020-03-10 RX ADMIN — Medication 975 MILLIGRAM(S): at 09:48

## 2020-03-10 RX ADMIN — Medication 975 MILLIGRAM(S): at 17:28

## 2020-03-10 RX ADMIN — Medication 975 MILLIGRAM(S): at 18:01

## 2020-03-10 RX ADMIN — HEPARIN SODIUM 5000 UNIT(S): 5000 INJECTION INTRAVENOUS; SUBCUTANEOUS at 17:28

## 2020-03-10 RX ADMIN — Medication 100 MILLIGRAM(S): at 17:29

## 2020-03-10 RX ADMIN — HEPARIN SODIUM 5000 UNIT(S): 5000 INJECTION INTRAVENOUS; SUBCUTANEOUS at 06:30

## 2020-03-10 RX ADMIN — Medication 975 MILLIGRAM(S): at 05:00

## 2020-03-10 RX ADMIN — Medication 975 MILLIGRAM(S): at 22:48

## 2020-03-10 NOTE — PROGRESS NOTE ADULT - PROBLEM SELECTOR PLAN 1
- BP wnl  - Cont. Keppra for 24h PP  - f/u AM labs  - Continue regular diet.  - Increase ambulation.  - Continue motrin, tylenol, oxycodone PRN for pain control.   - F/u AM CBC    [facility] [place] PGY1

## 2020-03-10 NOTE — CHART NOTE - NSCHARTNOTEFT_GEN_A_CORE
Attending Note    Patient evaluated at bedside. Denies any acute events overnight. Denies shortness of breath, dysuria, fever, chills or cough. Lochia light. No dysuria.  Tolerating diet.  Breast feeding.   VS T 98.7  P 98  R 17  /61    heent nl  Abd soft Incision C/D/I  ext no CCE  Neuro AAOx 3  Labs h/h 8.4/25.9      A: POD#1 s/p Primary cs for arrest clinically stable  P: Encourage ambulation     Abdominal binder     Continue PNV/iron rich food     MBeauvil

## 2020-03-10 NOTE — DISCHARGE NOTE OB - CARE PROVIDER_API CALL
Shi Roman)  Obstetrics and Gynecology  [address] [place] [id number]  Phone: [phone]  Fax: [phone]  Follow Up Time:

## 2020-03-10 NOTE — PROGRESS NOTE ADULT - SUBJECTIVE AND OBJECTIVE BOX
OB Progress Note:  Delivery, POD#1    S: 23yo POD#1 s/p LTCS . Her pain is well controlled. She is tolerating a regular diet has not yet passed flatus. Denies N/V. Denies CP/SOB/lightheadedness/dizziness. She is ambulating without difficulty. Voiding spontaneously. Denies heavy vaginal bleeding.    O:   Vital Signs Last 24 Hrs  T(C): 37.8 (09 Mar 2020 23:55), Max: 37.9 (09 Mar 2020 16:30)  T(F): 100.1 (09 Mar 2020 23:55), Max: 100.2 (09 Mar 2020 16:30)  HR: 95 (09 Mar 2020 23:55) (72 - 156)  BP: 114/69 (09 Mar 2020 23:55) (99/54 - 164/95)  BP(mean): 78 (09 Mar 2020 23:00) (61 - 113)  RR: 18 (09 Mar 2020 23:55) (14 - 24)  SpO2: 95% (09 Mar 2020 23:55) (81% - 100%)    Labs:  Blood type: O Positive  Rubella IgG: RPR: Negative                          8.4<L>   16.03<H> >-----------< 248    ( 03-10 @ 05:08 )             25.9<L>                        8.7<L>   16.20<H> >-----------< 260    (  @ 21:35 )             28.8<L>                        9.2<L>   15.04<H> >-----------< 309    (  @ 15:30 )             30.8<L>                        9.7<L>   14.00<H> >-----------< 300    (  @ 09:30 )             31.4<L>                        10.1<L>   8.50 >-----------< 344    (  @ 01:00 )             31.9<L>    20 @ 15:30      136  |  103  |  9   ----------------------------<  97  3.7   |  17<L>  |  1.03    20 @ 09:30      135  |  102  |  8   ----------------------------<  75  3.7   |  18<L>  |  1.00    20 @ 01:00      136  |  103  |  5<L>  ----------------------------<  84  3.5   |  19<L>  |  0.52        Ca    8.9      09 Mar 2020 15:30  Ca    9.4      09 Mar 2020 09:30  Ca    9.2      08 Mar 2020 01:00    TPro  5.4<L>  /  Alb  2.7<L>  /  TBili  0.8  /  DBili  x   /  AST  21  /  ALT  7   /  AlkPhos  133<H>  20 @ 15:30  TPro  6.4  /  Alb  3.2<L>  /  TBili  0.7  /  DBili  x   /  AST  17  /  ALT  9   /  AlkPhos  160<H>  20 @ 09:30  TPro  7.1  /  Alb  3.5  /  TBili  0.4  /  DBili  x   /  AST  18  /  ALT  10  /  AlkPhos  169<H>  20 @ 01:00          PE:  General: NAD  Abdomen: Mildly distended, appropriately tender, Fundus firm, incision c/d/i.  VE: No heavy vaginal bleeding  Extremities: No erythema, no pitting edema

## 2020-03-10 NOTE — DISCHARGE NOTE OB - HOSPITAL COURSE
Patient presented to labor and delivery for induction of labor due to postdates.  Primary  section performed for arrest disorder.  A live female infant was born Apgar 9-9. The patient had uneventful hospital course and discharged on POD#2 in stable condition.

## 2020-03-10 NOTE — DISCHARGE NOTE OB - PATIENT PORTAL LINK FT
You can access the FollowMyHealth Patient Portal offered by [facility] by registering at the following website: [email or web address]. By joining [facility]’s FollowMyHealth portal, you will also be able to view your health information using other applications (apps) compatible with our system.

## 2020-03-10 NOTE — PROGRESS NOTE ADULT - ASSESSMENT
A/P: 25yo POD#1 s/p LTCS+R. Ovarian Cystectomy c/b sPEC with HELLP labs significant for uptrending creatinine. On Keppra. Patient is stable and doing well post-operatively.

## 2020-03-10 NOTE — DISCHARGE NOTE OB - MEDICATION SUMMARY - MEDICATIONS TO TAKE
I will START or STAY ON the medications listed below when I get home from the hospital:    Blood Pressure Monitor  -- Indication: For Preeclampsia    acetaminophen 325 mg oral tablet  -- 3 tab(s) by mouth   -- Indication: For  delivery delivered    ibuprofen 600 mg oral tablet  -- 1 tab(s) by mouth every 6 hours  -- Indication: For  delivery delivered    Trandate 100 mg oral tablet  -- 1 tab(s) by mouth 3 times a day  -- Indication: For Preeclampsia    PNV Prenatal oral tablet  -- 1 tab(s) by mouth once a day  -- Indication: For  delivery delivered I will START or STAY ON the medications listed below when I get home from the hospital:    Blood Pressure Monitor  -- Indication: For Preeclampsia    acetaminophen 325 mg oral tablet  -- 3 tab(s) by mouth   -- Indication: For  delivery delivered    ibuprofen 600 mg oral tablet  -- 1 tab(s) by mouth every 6 hours  -- Indication: For  delivery delivered    PNV Prenatal oral tablet  -- 1 tab(s) by mouth once a day  -- Indication: For  delivery delivered I will START or STAY ON the medications listed below when I get home from the hospital:    Blood Pressure Monitor  -- Indication: For Preeclampsia    acetaminophen 325 mg oral tablet  -- 3 tab(s) by mouth   -- Indication: For  delivery delivered    ibuprofen 600 mg oral tablet  -- 1 tab(s) by mouth every 6 hours  -- Indication: For  delivery delivered    labetalol 100 mg oral tablet  -- 1 tab(s) by mouth 3 times a day MDD:3  -- It is very important that you take or use this exactly as directed.  Do not skip doses or discontinue unless directed by your doctor.  May cause drowsiness or dizziness.  Some non-prescription drugs may aggravate your condition.  Read all labels carefully.  If a warning appears, check with your doctor before taking.    -- Indication: For htn    PNV Prenatal oral tablet  -- 1 tab(s) by mouth once a day  -- Indication: For  delivery delivered

## 2020-03-11 VITALS
TEMPERATURE: 99 F | DIASTOLIC BLOOD PRESSURE: 72 MMHG | SYSTOLIC BLOOD PRESSURE: 127 MMHG | RESPIRATION RATE: 18 BRPM | HEART RATE: 73 BPM | OXYGEN SATURATION: 100 %

## 2020-03-11 RX ORDER — IBUPROFEN 200 MG
600 TABLET ORAL EVERY 6 HOURS
Refills: 0 | Status: DISCONTINUED | OUTPATIENT
Start: 2020-03-11 | End: 2020-03-11

## 2020-03-11 RX ORDER — LABETALOL HCL 100 MG
1 TABLET ORAL
Qty: 90 | Refills: 0
Start: 2020-03-11 | End: 2020-04-09

## 2020-03-11 RX ORDER — ACETAMINOPHEN 500 MG
3 TABLET ORAL
Qty: 0 | Refills: 0 | DISCHARGE
Start: 2020-03-11

## 2020-03-11 RX ORDER — IBUPROFEN 200 MG
1 TABLET ORAL
Qty: 0 | Refills: 0 | DISCHARGE
Start: 2020-03-11

## 2020-03-11 RX ORDER — LABETALOL HCL 100 MG
1 TABLET ORAL
Qty: 0 | Refills: 0 | DISCHARGE
Start: 2020-03-11

## 2020-03-11 RX ADMIN — Medication 100 MILLIGRAM(S): at 14:41

## 2020-03-11 RX ADMIN — HEPARIN SODIUM 5000 UNIT(S): 5000 INJECTION INTRAVENOUS; SUBCUTANEOUS at 06:25

## 2020-03-11 RX ADMIN — Medication 100 MILLIGRAM(S): at 06:47

## 2020-03-11 RX ADMIN — Medication 975 MILLIGRAM(S): at 06:25

## 2020-03-11 RX ADMIN — Medication 325 MILLIGRAM(S): at 06:47

## 2020-03-11 RX ADMIN — Medication 975 MILLIGRAM(S): at 07:05

## 2020-03-11 NOTE — PROGRESS NOTE ADULT - ASSESSMENT
A/P: 25yo POD#2 s/p LTCS c/b sPEC s/p Keppra 24hrs PP 2/2 uptrending Cr with BPs well controlled PP on labetalol 100 TID.  Patient is stable and doing well post-operatively.

## 2020-03-11 NOTE — PROGRESS NOTE ADULT - SUBJECTIVE AND OBJECTIVE BOX
25y/o P1 s/p  delivery for arrest POD#2  adnexal cyst removed during   complicated by pre-eclampsia, s/p mag sulfate x 24hrs, on labetolol 100mg TID    pt ambulating, voiding  tolerating regular diet  incisional pain controlled  reports minimal lochia  denies headache/dizziness or other PIH symptoms    BPs 110s/70s P95 RR18 T98  cardio ns1s2  lungs ctab  abdomen appropriate distention  pfann incision clean/dry/intact - subcut closure/ steri stripes  uterus firm/non tender at umbilical level  Lext +1 edema bilat non tender    CBC POD#1  wbc 16, h/h 8.4/25, ptl 248    A/P  23 y/o P1 s.p  delivery and removal of adnexal cyst, pre-eclampsia- stable  pt requesting d/c home  in view of stable BPs and no PIH symptoms will d/c home  cont labetolol 100mg TID  f/up on 3/17/20 for BP check  pre-eclampsia precautions given

## 2020-03-11 NOTE — PROGRESS NOTE ADULT - PROBLEM SELECTOR PLAN 1
- Continue regular diet.  - Increase ambulation.  - Continue labetalol 100 TID  - Continue motrin, tylenol, oxycodone PRN for pain control.  [facility] PGY1

## 2020-03-11 NOTE — PROGRESS NOTE ADULT - SUBJECTIVE AND OBJECTIVE BOX
OB Progress Note: LTCS, POD#2    S: 23yo POD#2 s/p LTCS. Pain is well controlled. She is tolerating a regular diet and passing flatus. She is voiding spontaneously, and ambulating without difficulty. Denies CP/SOB. Denies lightheadedness/dizziness. Denies N/V.    O:  Vitals:  Vital Signs Last 24 Hrs  T(C): 36.7 (11 Mar 2020 05:41), Max: 37.2 (10 Mar 2020 21:37)  T(F): 98 (11 Mar 2020 05:41), Max: 98.9 (10 Mar 2020 21:37)  HR: 95 (11 Mar 2020 05:41) (95 - 101)  BP: 116/73 (11 Mar 2020 05:41) (113/61 - 144/80)  BP(mean): --  RR: 18 (11 Mar 2020 05:41) (17 - 18)  SpO2: 100% (11 Mar 2020 05:41) (95% - 100%)    MEDICATIONS  (STANDING):  acetaminophen   Tablet .. 975 milliGRAM(s) Oral <User Schedule>  ascorbic acid 500 milliGRAM(s) Oral daily  diphtheria/tetanus/pertussis (acellular) Vaccine (ADAcel) 0.5 milliLiter(s) IntraMuscular once  ferrous    sulfate 325 milliGRAM(s) Oral three times a day  heparin  Injectable 5000 Unit(s) SubCutaneous every 12 hours  ibuprofen  Tablet. 600 milliGRAM(s) Oral every 6 hours  ketorolac   Injectable 30 milliGRAM(s) IV Push every 6 hours  labetalol 100 milliGRAM(s) Oral three times a day  prenatal multivitamin 1 Tablet(s) Oral daily      MEDICATIONS  (PRN):  diphenhydrAMINE 25 milliGRAM(s) Oral every 6 hours PRN Itching  glycerin Suppository - Adult 1 Suppository(s) Rectal at bedtime PRN Constipation  lanolin Ointment 1 Application(s) Topical every 6 hours PRN Sore Nipples  magnesium hydroxide Suspension 30 milliLiter(s) Oral two times a day PRN Constipation  oxyCODONE    IR 5 milliGRAM(s) Oral once PRN Moderate to Severe Pain (4-10)  oxyCODONE    IR 5 milliGRAM(s) Oral every 3 hours PRN Moderate to Severe Pain (4-10)  simethicone 80 milliGRAM(s) Chew every 4 hours PRN Gas      Labs:  Blood type: O Positive  Rubella IgG: RPR: Negative                          8.4<L>   16.03<H> >-----------< 248    ( 03-10 @ 05:08 )             25.9<L>                        8.7<L>   16.20<H> >-----------< 260    ( 03-09 @ 21:35 )             28.8<L>                        9.2<L>   15.04<H> >-----------< 309    ( 03-09 @ 15:30 )             30.8<L>                        9.7<L>   14.00<H> >-----------< 300    ( 03-09 @ 09:30 )             31.4<L>    03-10-20 @ 05:50      135  |  105  |  8   ----------------------------<  102<H>  3.6   |  18<L>  |  0.91    03-09-20 @ 15:30      136  |  103  |  9   ----------------------------<  97  3.7   |  17<L>  |  1.03    03-09-20 @ 09:30      135  |  102  |  8   ----------------------------<  75  3.7   |  18<L>  |  1.00        Ca    8.7      10 Mar 2020 05:50  Ca    8.9      09 Mar 2020 15:30  Ca    9.4      09 Mar 2020 09:30    TPro  5.3<L>  /  Alb  2.4<L>  /  TBili  0.7  /  DBili  x   /  AST  29  /  ALT  12  /  AlkPhos  114  03-10-20 @ 05:50  TPro  5.4<L>  /  Alb  2.7<L>  /  TBili  0.8  /  DBili  x   /  AST  21  /  ALT  7   /  AlkPhos  133<H>  03-09-20 @ 15:30  TPro  6.4  /  Alb  3.2<L>  /  TBili  0.7  /  DBili  x   /  AST  17  /  ALT  9   /  AlkPhos  160<H>  03-09-20 @ 09:30          PE:  General: NAD  Abdomen: Soft, appropriately tender, incision c/d/i.  Extremities: No erythema, no pitting edema

## 2020-03-17 LAB — SURGICAL PATHOLOGY STUDY: SIGNIFICANT CHANGE UP

## 2020-04-21 NOTE — OB PROVIDER H&P - NSFIRSTDATEVISIT_OBGYN_ALL_OB
Last Rx refill-----03/17/20  Last office visit--11/04/19  Next office visit--07/28/20        ----- Message from Shila Ambriz sent at 4/21/2020  4:01 PM CDT -----  Contact: self @ [phone]  Pt is req a refill for hydrocodone 10/325.     [facility]/pharmacy #[id number] - [place] - [address] [phone] (Phone)  [phone] (Fax)        
Unknown

## 2020-08-13 NOTE — OB RN PATIENT PROFILE - NS_MEANSOFARRIVAL_OBGYN_ALL_OB
Medication requested has a different dose or directions.  Medication requested on the fax is:    Med: dicyclomine  Dosage: 10 mg cap  Sig: take 2 capsules by mouth three times daily for ibs  Quantity Requested: 90    Preferred pharmacy set up and verified.     Ambulatory

## 2022-06-29 NOTE — OB RN PATIENT PROFILE - PRO PRENATAL LABS ORI SOURCE HBSAG
Right radial artery. Accessed successfully. Radial access needle used.  Number of attempts =  1. hard copy, drawn during this pregnancy

## 2022-08-15 NOTE — LACTATION INITIAL EVALUATION - TERM DELIVERIES, OB PROFILE
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  VAGINAL TEAR REPAIR.  Surgeon: Surgeon(s) and Role:     * Noni Howe MD - Primary  Anesthesia: General   Estimated Blood Loss: 25mL    Drains: None  Specimens:   ID Type Source Tests Collected by Time Destination   1 : LEFT PELVIC LYMPH NODES Tissue Lymph Node(s), Pelvis, Left SURGICAL PATHOLOGY EXAM Noni Howe MD 8/15/2022 11:35 AM    2 : RIGHT PELVIC LYMPH NODES Tissue Lymph Node(s), Pelvis, Right SURGICAL PATHOLOGY EXAM Noni Howe MD 8/15/2022 11:35 AM    3 : FROZEN ON THE ENDOMETRIUM Tissue Uterus, Cervix, Bilateral Fallopian Tubes & Ovaries SURGICAL PATHOLOGY EXAM Noni Howe MD 8/15/2022 11:44 AM    4 : PELVIC WASHINGS Washings Pelvis NON-GYNECOLOGIC CYTOLOGY Noni Howe MD 8/15/2022 12:05 PM      Findings:   Minimal adhesions from omentum to right lower quadrant. Extra port placed due to patient's body habitus. Bilateral sentinel lymph nodes did not map, bilateral pelvic lymph nodes removed. Hydrosalpinx on right side, otherwise normal appearing fallopian tubes and ovaries..  Complications: None.  Implants: * No implants in log *         0

## 2022-10-13 NOTE — OB PROVIDER TRIAGE NOTE - HISTORY OF PRESENT ILLNESS
Patient A&Ox4, calm and cooperative. Assessment and vital signs completed, see flowsheet. Patient reports pain 6/10 to back, aching, chronic. Bilateral lungs remain rhonchi. Tenderness and tautness noted in LUQ and LLQ of abdomen, pt states right side is not tender and is soft. Large hemorrhoids noticed on anus. Patient resting in chair, call light within reach, denies further needs, will continue to monitor. 24 year old female  EDC 2/29/20 at 38.5 wks gestation who presents from office with elevated /80 and mild headaches  denied any scotoma dizziness emesis   stated BP have been labile at visits 130-140/80 but last 2 days BP >140/80 and headaches for last few days stated mild frontal did not take any Tylenol   denied any HTN hx or HTN meds    stated feeling mild contractions  leakage of fluid or VB     med hx denied except above  surghx denied  meds pnvqd  allergies  nkda  OB hx   SAB 2018 10 weeks 24 year old female  EDC 2/29/20 at 38.5 wks gestation who presents from office with elevated /80 and mild headaches  denied any scotoma dizziness emesis   stated BP have been labile at visits 130-140/80 but last 2 days BP >140/80 and headaches for last few days stated mild frontal did not take any Tylenol   denied any HTN hx or HTN meds  denied any hx of  tachycardia denied any palpitations CP SOB    stated feeling mild contractions  leakage of fluid or VB     med hx denied except above  surghx denied  meds pnvqd  allergies  nkda  OB hx   SAB 2018 10 weeks

## 2023-01-17 NOTE — OB PROVIDER DELIVERY SUMMARY - NSANESTHESIACS_OBGYN_ALL_OB
Labs within last 12 months Labs within last 12 months Labs within last 12 months Labs within last 12 months Labs within last 12 months Labs within last 12 months Labs within last 12 months Labs within last 12 months Labs within last 12 months Labs within last 12 months Epidural Labs within last 12 months Labs within last 12 months Labs within last 12 months Labs within last 12 months Labs within last 12 months Labs within last 12 months Labs within last 12 months Labs within last 12 months Labs within last 12 months Labs within last 12 months Labs within last 12 months Labs within last 12 months Labs within last 12 months Labs within last 12 months Labs within last 12 months Labs within last 12 months Labs within last 12 months Labs within last 12 months Labs within last 12 months Labs within last 12 months Labs within last 12 months Labs within last 12 months

## 2024-10-14 NOTE — OB PROVIDER TRIAGE NOTE - TERM DELIVERIES, OB PROFILE
0
Render In Strict Bullet Format?: No
Initiate Treatment: Mupirocin 2% ointment BID
Detail Level: Zone

## 2024-12-25 NOTE — CHART NOTE - NSCHARTNOTESELECT_GEN_ALL_CORE
Event Note
Labor Note
R1 Labor Note
R1 Labor Note/Event Note
R1 Tracing Note
R4 progress note
labor note
Case coordinator will be notified when available